# Patient Record
Sex: FEMALE | Race: WHITE | Employment: OTHER | URBAN - METROPOLITAN AREA
[De-identification: names, ages, dates, MRNs, and addresses within clinical notes are randomized per-mention and may not be internally consistent; named-entity substitution may affect disease eponyms.]

---

## 2017-01-25 RX ORDER — LORAZEPAM 0.5 MG/1
TABLET ORAL
Qty: 60 TABLET | Refills: 0 | Status: SHIPPED | OUTPATIENT
Start: 2017-01-25 | End: 2017-02-27 | Stop reason: SDUPTHER

## 2017-01-25 RX ORDER — LISINOPRIL AND HYDROCHLOROTHIAZIDE 12.5; 1 MG/1; MG/1
TABLET ORAL
Qty: 30 TABLET | Refills: 3 | Status: SHIPPED | OUTPATIENT
Start: 2017-01-25 | End: 2017-05-30 | Stop reason: SDUPTHER

## 2017-01-30 ENCOUNTER — OFFICE VISIT (OUTPATIENT)
Dept: FAMILY MEDICINE CLINIC | Facility: CLINIC | Age: 62
End: 2017-01-30

## 2017-01-30 VITALS
SYSTOLIC BLOOD PRESSURE: 110 MMHG | DIASTOLIC BLOOD PRESSURE: 82 MMHG | TEMPERATURE: 98.6 F | HEART RATE: 76 BPM | WEIGHT: 160 LBS | HEIGHT: 65 IN | BODY MASS INDEX: 26.66 KG/M2

## 2017-01-30 DIAGNOSIS — J20.8 ACUTE BRONCHITIS DUE TO OTHER SPECIFIED ORGANISMS: Primary | ICD-10-CM

## 2017-01-30 DIAGNOSIS — I10 ESSENTIAL HYPERTENSION: ICD-10-CM

## 2017-01-30 PROCEDURE — 99213 OFFICE O/P EST LOW 20 MIN: CPT | Performed by: FAMILY MEDICINE

## 2017-01-30 RX ORDER — GUAIFENESIN AND CODEINE PHOSPHATE 100; 10 MG/5ML; MG/5ML
5 SOLUTION ORAL EVERY 4 HOURS PRN
Qty: 180 ML | Refills: 0 | Status: SHIPPED | OUTPATIENT
Start: 2017-01-30 | End: 2018-02-08 | Stop reason: SDUPTHER

## 2017-01-30 RX ORDER — BENZONATATE 200 MG/1
CAPSULE ORAL
Qty: 30 CAPSULE | Refills: 1 | Status: SHIPPED | OUTPATIENT
Start: 2017-01-30 | End: 2021-03-17 | Stop reason: ALTCHOICE

## 2017-01-30 ASSESSMENT — ENCOUNTER SYMPTOMS
COUGH: 1
HEARTBURN: 0
VOMITING: 0
DOUBLE VISION: 0
BLURRED VISION: 0
EYE PAIN: 0
WHEEZING: 0
NAUSEA: 0
SHORTNESS OF BREATH: 0
ABDOMINAL PAIN: 0

## 2017-02-03 ENCOUNTER — TELEPHONE (OUTPATIENT)
Dept: FAMILY MEDICINE CLINIC | Facility: CLINIC | Age: 62
End: 2017-02-03

## 2017-02-03 RX ORDER — HYDROCODONE POLISTIREX AND CHLORPHENIRAMINE POLISTIREX 10; 8 MG/5ML; MG/5ML
5 SUSPENSION, EXTENDED RELEASE ORAL EVERY 12 HOURS PRN
Qty: 115 ML | Refills: 0 | Status: SHIPPED | OUTPATIENT
Start: 2017-02-03 | End: 2019-05-22

## 2017-02-03 RX ORDER — AZITHROMYCIN 250 MG/1
TABLET, FILM COATED ORAL
Qty: 1 PACKET | Refills: 0 | Status: SHIPPED | OUTPATIENT
Start: 2017-02-03 | End: 2017-02-13

## 2017-02-28 RX ORDER — LORAZEPAM 0.5 MG/1
TABLET ORAL
Qty: 60 TABLET | Refills: 0 | Status: SHIPPED | OUTPATIENT
Start: 2017-02-28 | End: 2017-04-03 | Stop reason: SDUPTHER

## 2017-03-07 ENCOUNTER — TELEPHONE (OUTPATIENT)
Dept: FAMILY MEDICINE CLINIC | Facility: CLINIC | Age: 62
End: 2017-03-07

## 2017-03-07 RX ORDER — VALACYCLOVIR HYDROCHLORIDE 1 G/1
2000 TABLET, FILM COATED ORAL 2 TIMES DAILY
Qty: 4 TABLET | Refills: 2 | Status: SHIPPED | OUTPATIENT
Start: 2017-03-07 | End: 2017-04-12 | Stop reason: SDUPTHER

## 2017-04-03 RX ORDER — LORAZEPAM 0.5 MG/1
TABLET ORAL
Qty: 60 TABLET | Refills: 0 | Status: SHIPPED | OUTPATIENT
Start: 2017-04-03 | End: 2017-05-01 | Stop reason: SDUPTHER

## 2017-04-12 ENCOUNTER — OFFICE VISIT (OUTPATIENT)
Dept: FAMILY MEDICINE CLINIC | Age: 62
End: 2017-04-12
Payer: MEDICARE

## 2017-04-12 VITALS
BODY MASS INDEX: 25.23 KG/M2 | SYSTOLIC BLOOD PRESSURE: 128 MMHG | TEMPERATURE: 97.1 F | DIASTOLIC BLOOD PRESSURE: 80 MMHG | WEIGHT: 157 LBS | HEART RATE: 73 BPM | HEIGHT: 66 IN

## 2017-04-12 DIAGNOSIS — J01.00 ACUTE NON-RECURRENT MAXILLARY SINUSITIS: Primary | ICD-10-CM

## 2017-04-12 DIAGNOSIS — B00.9 HERPES SIMPLEX INFECTION: ICD-10-CM

## 2017-04-12 PROCEDURE — 99213 OFFICE O/P EST LOW 20 MIN: CPT | Performed by: FAMILY MEDICINE

## 2017-04-12 RX ORDER — VALACYCLOVIR HYDROCHLORIDE 1 G/1
2000 TABLET, FILM COATED ORAL 2 TIMES DAILY
Qty: 4 TABLET | Refills: 2 | Status: SHIPPED | OUTPATIENT
Start: 2017-04-12 | End: 2019-05-22

## 2017-04-12 RX ORDER — AZITHROMYCIN 250 MG/1
TABLET, FILM COATED ORAL
Qty: 1 PACKET | Refills: 0 | Status: SHIPPED | OUTPATIENT
Start: 2017-04-12 | End: 2018-03-19 | Stop reason: ALTCHOICE

## 2017-04-12 RX ORDER — LIDOCAINE 50 MG/G
PATCH TOPICAL
COMMUNITY
Start: 2017-02-10 | End: 2019-05-22

## 2017-04-12 RX ORDER — FLUTICASONE PROPIONATE 50 MCG
1 SPRAY, SUSPENSION (ML) NASAL DAILY
Qty: 1 BOTTLE | Refills: 3 | Status: SHIPPED | OUTPATIENT
Start: 2017-04-12 | End: 2017-12-09 | Stop reason: SDUPTHER

## 2017-04-12 ASSESSMENT — PATIENT HEALTH QUESTIONNAIRE - PHQ9
2. FEELING DOWN, DEPRESSED OR HOPELESS: 1
SUM OF ALL RESPONSES TO PHQ9 QUESTIONS 1 & 2: 2
1. LITTLE INTEREST OR PLEASURE IN DOING THINGS: 1
SUM OF ALL RESPONSES TO PHQ QUESTIONS 1-9: 2

## 2017-04-12 ASSESSMENT — ENCOUNTER SYMPTOMS
WHEEZING: 0
VOMITING: 0
EYES NEGATIVE: 1
NAUSEA: 0
COUGH: 1
SPUTUM PRODUCTION: 0
SHORTNESS OF BREATH: 0
DIARRHEA: 0
SORE THROAT: 0
ABDOMINAL PAIN: 0

## 2017-05-01 RX ORDER — LORAZEPAM 0.5 MG/1
TABLET ORAL
Qty: 60 TABLET | Refills: 0 | Status: SHIPPED | OUTPATIENT
Start: 2017-05-01 | End: 2017-05-30 | Stop reason: SDUPTHER

## 2017-05-30 RX ORDER — LISINOPRIL AND HYDROCHLOROTHIAZIDE 12.5; 1 MG/1; MG/1
TABLET ORAL
Qty: 30 TABLET | Refills: 2 | Status: SHIPPED | OUTPATIENT
Start: 2017-05-30 | End: 2017-08-26 | Stop reason: SDUPTHER

## 2017-05-30 RX ORDER — LORAZEPAM 0.5 MG/1
TABLET ORAL
Qty: 60 TABLET | Refills: 0 | Status: SHIPPED | OUTPATIENT
Start: 2017-05-30 | End: 2017-06-27 | Stop reason: SDUPTHER

## 2017-06-27 RX ORDER — LORAZEPAM 0.5 MG/1
TABLET ORAL
Qty: 60 TABLET | Refills: 0 | Status: SHIPPED | OUTPATIENT
Start: 2017-06-27 | End: 2017-07-26 | Stop reason: SDUPTHER

## 2017-07-26 RX ORDER — LORAZEPAM 0.5 MG/1
TABLET ORAL
Qty: 60 TABLET | Refills: 0 | Status: SHIPPED | OUTPATIENT
Start: 2017-07-26 | End: 2017-08-24 | Stop reason: SDUPTHER

## 2017-08-24 RX ORDER — LORAZEPAM 0.5 MG/1
TABLET ORAL
Qty: 60 TABLET | Refills: 0 | Status: SHIPPED | OUTPATIENT
Start: 2017-08-24 | End: 2017-09-22 | Stop reason: SDUPTHER

## 2017-08-24 RX ORDER — LORAZEPAM 0.5 MG/1
TABLET ORAL
Qty: 60 TABLET | Refills: 0 | Status: SHIPPED | OUTPATIENT
Start: 2017-08-24 | End: 2017-08-24 | Stop reason: SDUPTHER

## 2017-09-22 RX ORDER — LORAZEPAM 0.5 MG/1
TABLET ORAL
Qty: 60 TABLET | Refills: 0 | Status: SHIPPED | OUTPATIENT
Start: 2017-09-22 | End: 2017-10-22 | Stop reason: SDUPTHER

## 2017-10-23 RX ORDER — LORAZEPAM 0.5 MG/1
TABLET ORAL
Qty: 60 TABLET | Refills: 0 | Status: SHIPPED | OUTPATIENT
Start: 2017-10-23 | End: 2017-11-19 | Stop reason: SDUPTHER

## 2017-10-30 ENCOUNTER — TELEPHONE (OUTPATIENT)
Dept: FAMILY MEDICINE CLINIC | Age: 62
End: 2017-10-30

## 2017-10-30 DIAGNOSIS — F41.9 ANXIETY: Primary | ICD-10-CM

## 2017-10-30 RX ORDER — LORAZEPAM 0.5 MG/1
0.5 TABLET ORAL EVERY 8 HOURS PRN
Qty: 30 TABLET | Refills: 0 | Status: SHIPPED | OUTPATIENT
Start: 2017-10-30 | End: 2017-11-21 | Stop reason: SDUPTHER

## 2017-10-30 NOTE — TELEPHONE ENCOUNTER
Patient called in tears asking for something go help her get through as her  is actively dying from cancer.

## 2017-11-20 RX ORDER — LORAZEPAM 0.5 MG/1
TABLET ORAL
Qty: 60 TABLET | Refills: 0 | Status: SHIPPED | OUTPATIENT
Start: 2017-11-20 | End: 2017-12-08 | Stop reason: SDUPTHER

## 2017-11-21 DIAGNOSIS — F41.9 ANXIETY: ICD-10-CM

## 2017-11-21 DIAGNOSIS — F32.9 REACTIVE DEPRESSION: Primary | ICD-10-CM

## 2017-11-21 RX ORDER — LORAZEPAM 0.5 MG/1
TABLET ORAL
Qty: 30 TABLET | Refills: 0 | Status: SHIPPED | OUTPATIENT
Start: 2017-11-21 | End: 2017-11-29 | Stop reason: SDUPTHER

## 2017-11-21 RX ORDER — CITALOPRAM 10 MG/1
10 TABLET ORAL DAILY
Qty: 30 TABLET | Refills: 3 | Status: SHIPPED | OUTPATIENT
Start: 2017-11-21 | End: 2018-03-19 | Stop reason: SDUPTHER

## 2017-11-21 NOTE — TELEPHONE ENCOUNTER
Ativan was refilled 11/20 and 11/21 so it should be at pharmacy. She can take 2 at a time if needed. Sent in rx for celexa to take daily also.

## 2017-11-21 NOTE — TELEPHONE ENCOUNTER
Patient states she spoke with you regarding the loss of her , she is asking for a refill on her ativan or higher dose, or her ativan and something to help her right now she is not as strong as she thought she was

## 2017-11-21 NOTE — TELEPHONE ENCOUNTER
Pharmacy request  Health Maintenance   Topic Date Due    Hepatitis C screen  1955    HIV screen  09/22/1968    Cervical cancer screen  09/22/1974    Lipid screen  09/22/1993    Diabetes screen  09/22/1993    Breast cancer screen  09/22/2003    Colon cancer screen colonoscopy  09/22/2003    Zostavax vaccine  09/22/2013    Flu vaccine (1) 09/01/2017    DTaP/Tdap/Td vaccine (2 - Td) 05/08/2023             (applicable per patient's age: Cancer Screenings, Depression Screening, Fall Risk Screening, Immunizations)    AST (U/L)   Date Value   06/27/2012 17     ALT (U/L)   Date Value   06/27/2012 16     BUN (mg/dL)   Date Value   06/27/2012 19      (goal A1C is < 7)   (goal LDL is <100) need 30-50% reduction from baseline     BP Readings from Last 3 Encounters:   04/12/17 128/80   01/30/17 110/82   01/08/16 (!) 174/102    (goal /80)      All Future Testing planned in [facility]:  Lab Frequency Next Occurrence       Next Visit Date:  No future appointments.          Patient Active Problem List:     Foreign body of thumb, left, infected     Lumbosacral neuritis

## 2017-11-29 DIAGNOSIS — F41.9 ANXIETY: ICD-10-CM

## 2017-11-30 RX ORDER — LORAZEPAM 0.5 MG/1
TABLET ORAL
Qty: 30 TABLET | Refills: 0 | Status: SHIPPED | OUTPATIENT
Start: 2017-11-30 | End: 2017-12-08 | Stop reason: SDUPTHER

## 2017-12-08 DIAGNOSIS — F41.9 ANXIETY: ICD-10-CM

## 2017-12-08 RX ORDER — LORAZEPAM 0.5 MG/1
TABLET ORAL
Qty: 30 TABLET | Refills: 0 | Status: SHIPPED | OUTPATIENT
Start: 2017-12-08 | End: 2017-12-19 | Stop reason: SDUPTHER

## 2017-12-10 RX ORDER — FLUTICASONE PROPIONATE 50 MCG
SPRAY, SUSPENSION (ML) NASAL
Qty: 1 BOTTLE | Refills: 2 | Status: SHIPPED | OUTPATIENT
Start: 2017-12-10 | End: 2019-05-22 | Stop reason: ALTCHOICE

## 2017-12-19 DIAGNOSIS — F41.9 ANXIETY: ICD-10-CM

## 2017-12-19 RX ORDER — LORAZEPAM 0.5 MG/1
TABLET ORAL
Qty: 30 TABLET | Refills: 0 | Status: SHIPPED | OUTPATIENT
Start: 2017-12-19 | End: 2017-12-29 | Stop reason: SDUPTHER

## 2017-12-27 RX ORDER — LISINOPRIL AND HYDROCHLOROTHIAZIDE 12.5; 1 MG/1; MG/1
TABLET ORAL
Qty: 30 TABLET | Refills: 3 | Status: SHIPPED | OUTPATIENT
Start: 2017-12-27 | End: 2018-04-26 | Stop reason: SDUPTHER

## 2017-12-29 DIAGNOSIS — F41.9 ANXIETY: ICD-10-CM

## 2017-12-29 RX ORDER — LORAZEPAM 0.5 MG/1
TABLET ORAL
Qty: 30 TABLET | Refills: 0 | Status: SHIPPED | OUTPATIENT
Start: 2017-12-29 | End: 2018-01-07 | Stop reason: SDUPTHER

## 2017-12-29 NOTE — TELEPHONE ENCOUNTER
Pharmacy request, last appt 4/12/17    Health Maintenance   Topic Date Due    Hepatitis C screen  1955    HIV screen  09/22/1968    Cervical cancer screen  09/22/1974    Lipid screen  09/22/1993    Diabetes screen  09/22/1993    Breast cancer screen  09/22/2003    Colon cancer screen colonoscopy  09/22/2003    Zostavax vaccine  09/22/2013    Flu vaccine (1) 09/01/2017    DTaP/Tdap/Td vaccine (2 - Td) 05/08/2023             (applicable per patient's age: Cancer Screenings, Depression Screening, Fall Risk Screening, Immunizations)    AST (U/L)   Date Value   06/27/2012 17     ALT (U/L)   Date Value   06/27/2012 16     BUN (mg/dL)   Date Value   06/27/2012 19      (goal A1C is < 7)   (goal LDL is <100) need 30-50% reduction from baseline     BP Readings from Last 3 Encounters:   04/12/17 128/80   01/30/17 110/82   01/08/16 (!) 174/102    (goal /80)      All Future Testing planned in [facility]:  Lab Frequency Next Occurrence       Next Visit Date:  No future appointments.          Patient Active Problem List:     Foreign body of thumb, left, infected     Lumbosacral neuritis

## 2018-01-17 DIAGNOSIS — F41.9 ANXIETY: ICD-10-CM

## 2018-01-17 RX ORDER — LORAZEPAM 0.5 MG/1
TABLET ORAL
Qty: 30 TABLET | Refills: 0 | Status: SHIPPED | OUTPATIENT
Start: 2018-01-17 | End: 2018-01-26 | Stop reason: SDUPTHER

## 2018-01-17 NOTE — TELEPHONE ENCOUNTER
Pharm requested refill last seen [phone]    Health Maintenance   Topic Date Due    Hepatitis C screen  1955    HIV screen  09/22/1968    Cervical cancer screen  09/22/1974    Lipid screen  09/22/1993    Diabetes screen  09/22/1993    Breast cancer screen  09/22/2003    Colon cancer screen colonoscopy  09/22/2003    Potassium monitoring  06/27/2013    Creatinine monitoring  06/27/2013    Zostavax vaccine  09/22/2013    Flu vaccine (1) 09/01/2017    DTaP/Tdap/Td vaccine (2 - Td) 05/08/2023             (applicable per patient's age: Cancer Screenings, Depression Screening, Fall Risk Screening, Immunizations)    AST (U/L)   Date Value   06/27/2012 17     ALT (U/L)   Date Value   06/27/2012 16     BUN (mg/dL)   Date Value   06/27/2012 19      (goal A1C is < 7)   (goal LDL is <100) need 30-50% reduction from baseline     BP Readings from Last 3 Encounters:   04/12/17 128/80   01/30/17 110/82   01/08/16 (!) 174/102    (goal /80)      All Future Testing planned in [facility]:  Lab Frequency Next Occurrence       Next Visit Date:  No future appointments.          Patient Active Problem List:     Foreign body of thumb, left, infected     Lumbosacral neuritis

## 2018-01-26 DIAGNOSIS — F41.9 ANXIETY: ICD-10-CM

## 2018-01-26 RX ORDER — LORAZEPAM 0.5 MG/1
TABLET ORAL
Qty: 30 TABLET | Refills: 0 | Status: SHIPPED | OUTPATIENT
Start: 2018-01-26 | End: 2018-02-05 | Stop reason: SDUPTHER

## 2018-02-05 DIAGNOSIS — F41.9 ANXIETY: ICD-10-CM

## 2018-02-05 RX ORDER — LORAZEPAM 0.5 MG/1
TABLET ORAL
Qty: 30 TABLET | Refills: 0 | Status: SHIPPED | OUTPATIENT
Start: 2018-02-05 | End: 2018-02-15 | Stop reason: SDUPTHER

## 2018-02-08 ENCOUNTER — TELEPHONE (OUTPATIENT)
Dept: FAMILY MEDICINE CLINIC | Age: 63
End: 2018-02-08

## 2018-02-08 DIAGNOSIS — B30.9 ACUTE VIRAL CONJUNCTIVITIS OF BOTH EYES: Primary | ICD-10-CM

## 2018-02-08 DIAGNOSIS — R05.9 COUGH: ICD-10-CM

## 2018-02-08 RX ORDER — OLOPATADINE HYDROCHLORIDE 1 MG/ML
1 SOLUTION/ DROPS OPHTHALMIC 2 TIMES DAILY
Qty: 5 ML | Refills: 1 | Status: SHIPPED | OUTPATIENT
Start: 2018-02-08 | End: 2018-03-10

## 2018-02-08 RX ORDER — GUAIFENESIN AND CODEINE PHOSPHATE 100; 10 MG/5ML; MG/5ML
5 SOLUTION ORAL EVERY 4 HOURS PRN
Qty: 180 ML | Refills: 0 | Status: SHIPPED | OUTPATIENT
Start: 2018-02-08 | End: 2018-02-13 | Stop reason: SDUPTHER

## 2018-02-08 NOTE — TELEPHONE ENCOUNTER
Pt c/o bloody nose when blowing her nose cough at night pt is getting no rest for 3 days pt has been instructed to treat the sx pt  is requesting eye drops for itching and eyes are draining pt has been using warm compress to get her eyes open in the morning [facility]

## 2018-02-13 DIAGNOSIS — R05.9 COUGH: ICD-10-CM

## 2018-02-14 RX ORDER — CODEINE PHOSPHATE AND GUAIFENESIN 10; 100 MG/5ML; MG/5ML
SOLUTION ORAL
Qty: 180 ML | Refills: 0 | Status: SHIPPED | OUTPATIENT
Start: 2018-02-14 | End: 2018-02-22 | Stop reason: SDUPTHER

## 2018-02-15 DIAGNOSIS — F41.9 ANXIETY: ICD-10-CM

## 2018-02-15 RX ORDER — LORAZEPAM 0.5 MG/1
TABLET ORAL
Qty: 30 TABLET | Refills: 0 | Status: SHIPPED | OUTPATIENT
Start: 2018-02-15 | End: 2018-02-26 | Stop reason: SDUPTHER

## 2018-02-22 DIAGNOSIS — R05.9 COUGH: ICD-10-CM

## 2018-02-22 RX ORDER — CODEINE PHOSPHATE AND GUAIFENESIN 10; 100 MG/5ML; MG/5ML
SOLUTION ORAL
Qty: 1 BOTTLE | Refills: 0 | Status: SHIPPED | OUTPATIENT
Start: 2018-02-22 | End: 2018-03-01

## 2018-02-26 DIAGNOSIS — F41.9 ANXIETY: ICD-10-CM

## 2018-02-26 RX ORDER — LORAZEPAM 0.5 MG/1
TABLET ORAL
Qty: 30 TABLET | Refills: 0 | Status: SHIPPED | OUTPATIENT
Start: 2018-02-26 | End: 2018-03-07 | Stop reason: SDUPTHER

## 2018-03-07 DIAGNOSIS — F41.9 ANXIETY: ICD-10-CM

## 2018-03-07 RX ORDER — LORAZEPAM 0.5 MG/1
TABLET ORAL
Qty: 30 TABLET | Refills: 0 | Status: SHIPPED | OUTPATIENT
Start: 2018-03-07 | End: 2018-03-18 | Stop reason: SDUPTHER

## 2018-03-07 NOTE — TELEPHONE ENCOUNTER
04/12/2017 last seen pharmacy request refill. Health Maintenance   Topic Date Due    Hepatitis C screen  1955    HIV screen  09/22/1968    Cervical cancer screen  09/22/1974    Lipid screen  09/22/1993    Diabetes screen  09/22/1993    Breast cancer screen  09/22/2003    Shingles Vaccine (1 of 2 - 2 Dose Series) 09/22/2003    Colon cancer screen colonoscopy  09/22/2003    Potassium monitoring  06/27/2013    Creatinine monitoring  06/27/2013    Flu vaccine (1) 09/01/2017    DTaP/Tdap/Td vaccine (2 - Td) 05/08/2023             (applicable per patient's age: Cancer Screenings, Depression Screening, Fall Risk Screening, Immunizations)    AST (U/L)   Date Value   06/27/2012 17     ALT (U/L)   Date Value   06/27/2012 16     BUN (mg/dL)   Date Value   06/27/2012 19      (goal A1C is < 7)   (goal LDL is <100) need 30-50% reduction from baseline     BP Readings from Last 3 Encounters:   04/12/17 128/80   01/30/17 110/82   01/08/16 (!) 174/102    (goal /80)      All Future Testing planned in [facility]:  Lab Frequency Next Occurrence       Next Visit Date:  No future appointments.          Patient Active Problem List:     Foreign body of thumb, left, infected     Lumbosacral neuritis

## 2018-03-19 DIAGNOSIS — F32.9 REACTIVE DEPRESSION: ICD-10-CM

## 2018-03-19 RX ORDER — CITALOPRAM 10 MG/1
TABLET ORAL
Qty: 30 TABLET | Refills: 2 | Status: SHIPPED | OUTPATIENT
Start: 2018-03-19 | End: 2018-06-18 | Stop reason: ALTCHOICE

## 2018-04-06 ENCOUNTER — HOSPITAL ENCOUNTER (EMERGENCY)
Age: 63
Discharge: HOME | End: 2018-04-06
Attending: FAMILY MEDICINE
Payer: COMMERCIAL

## 2018-04-06 VITALS
TEMPERATURE: 98.4 F | BODY MASS INDEX: 25.39 KG/M2 | WEIGHT: 158 LBS | HEART RATE: 80 BPM | HEIGHT: 66 IN | RESPIRATION RATE: 16 BRPM | SYSTOLIC BLOOD PRESSURE: 123 MMHG | OXYGEN SATURATION: 95 % | DIASTOLIC BLOOD PRESSURE: 73 MMHG

## 2018-04-06 DIAGNOSIS — T23.222A PARTIAL THICKNESS BURN OF FINGER OF LEFT HAND, INITIAL ENCOUNTER: Primary | ICD-10-CM

## 2018-04-06 PROCEDURE — 6370000000 HC RX 637 (ALT 250 FOR IP): Performed by: NURSE PRACTITIONER

## 2018-04-06 PROCEDURE — 6370000000 HC RX 637 (ALT 250 FOR IP): Performed by: FAMILY MEDICINE

## 2018-04-06 PROCEDURE — 99283 EMERGENCY DEPT VISIT LOW MDM: CPT

## 2018-04-06 PROCEDURE — 16000 INITIAL TREATMENT OF BURN(S): CPT

## 2018-04-06 RX ORDER — HYDROCODONE BITARTRATE AND ACETAMINOPHEN 5; 325 MG/1; MG/1
2 TABLET ORAL ONCE
Status: COMPLETED | OUTPATIENT
Start: 2018-04-06 | End: 2018-04-06

## 2018-04-06 RX ORDER — IBUPROFEN 600 MG/1
600 TABLET ORAL EVERY 8 HOURS PRN
Qty: 20 TABLET | Refills: 0 | Status: SHIPPED | OUTPATIENT
Start: 2018-04-06 | End: 2019-05-22

## 2018-04-06 RX ORDER — CEPHALEXIN 500 MG/1
500 CAPSULE ORAL 4 TIMES DAILY
Qty: 40 CAPSULE | Refills: 0 | Status: SHIPPED | OUTPATIENT
Start: 2018-04-06 | End: 2018-04-16

## 2018-04-06 RX ADMIN — LIDOCAINE HYDROCHLORIDE: 20 JELLY TOPICAL at 16:36

## 2018-04-06 RX ADMIN — HYDROCODONE BITARTRATE AND ACETAMINOPHEN 2 TABLET: 5; 325 TABLET ORAL at 16:08

## 2018-04-06 ASSESSMENT — PAIN DESCRIPTION - PAIN TYPE: TYPE: ACUTE PAIN

## 2018-04-06 ASSESSMENT — PAIN SCALES - GENERAL
PAINLEVEL_OUTOF10: 2
PAINLEVEL_OUTOF10: 9

## 2018-04-06 ASSESSMENT — ENCOUNTER SYMPTOMS: COLOR CHANGE: 1

## 2018-04-06 ASSESSMENT — PAIN DESCRIPTION - DESCRIPTORS: DESCRIPTORS: ACHING

## 2018-04-26 RX ORDER — LISINOPRIL AND HYDROCHLOROTHIAZIDE 12.5; 1 MG/1; MG/1
TABLET ORAL
Qty: 30 TABLET | Refills: 2 | Status: SHIPPED | OUTPATIENT
Start: 2018-04-26 | End: 2018-07-26 | Stop reason: SDUPTHER

## 2018-05-12 DIAGNOSIS — F41.9 ANXIETY: ICD-10-CM

## 2018-05-14 RX ORDER — LORAZEPAM 0.5 MG/1
TABLET ORAL
Qty: 60 TABLET | Refills: 0 | Status: SHIPPED | OUTPATIENT
Start: 2018-05-14 | End: 2018-05-31 | Stop reason: SDUPTHER

## 2018-05-31 DIAGNOSIS — F41.9 ANXIETY: ICD-10-CM

## 2018-06-01 RX ORDER — LORAZEPAM 0.5 MG/1
TABLET ORAL
Qty: 60 TABLET | Refills: 0 | Status: SHIPPED | OUTPATIENT
Start: 2018-06-01 | End: 2018-06-19 | Stop reason: SDUPTHER

## 2018-06-18 ENCOUNTER — OFFICE VISIT (OUTPATIENT)
Dept: FAMILY MEDICINE CLINIC | Age: 63
End: 2018-06-18
Payer: COMMERCIAL

## 2018-06-18 VITALS
SYSTOLIC BLOOD PRESSURE: 120 MMHG | DIASTOLIC BLOOD PRESSURE: 80 MMHG | HEART RATE: 80 BPM | BODY MASS INDEX: 25.82 KG/M2 | WEIGHT: 160 LBS

## 2018-06-18 DIAGNOSIS — Z90.710 H/O TOTAL HYSTERECTOMY: ICD-10-CM

## 2018-06-18 DIAGNOSIS — Z13.31 POSITIVE DEPRESSION SCREENING: ICD-10-CM

## 2018-06-18 DIAGNOSIS — J30.2 CHRONIC SEASONAL ALLERGIC RHINITIS DUE TO OTHER ALLERGEN: ICD-10-CM

## 2018-06-18 DIAGNOSIS — F41.9 ANXIETY: ICD-10-CM

## 2018-06-18 DIAGNOSIS — F32.A DEPRESSIVE DISORDER: ICD-10-CM

## 2018-06-18 DIAGNOSIS — Z11.9 SCREENING EXAMINATION FOR INFECTIOUS DISEASE: ICD-10-CM

## 2018-06-18 DIAGNOSIS — Z13.220 SCREENING CHOLESTEROL LEVEL: ICD-10-CM

## 2018-06-18 DIAGNOSIS — F32.9 REACTIVE DEPRESSION: Primary | ICD-10-CM

## 2018-06-18 DIAGNOSIS — I10 ESSENTIAL HYPERTENSION: ICD-10-CM

## 2018-06-18 DIAGNOSIS — F43.21 GRIEF REACTION: ICD-10-CM

## 2018-06-18 PROCEDURE — 3017F COLORECTAL CA SCREEN DOC REV: CPT | Performed by: FAMILY MEDICINE

## 2018-06-18 PROCEDURE — G8431 POS CLIN DEPRES SCRN F/U DOC: HCPCS | Performed by: FAMILY MEDICINE

## 2018-06-18 PROCEDURE — G8427 DOCREV CUR MEDS BY ELIG CLIN: HCPCS | Performed by: FAMILY MEDICINE

## 2018-06-18 PROCEDURE — 1036F TOBACCO NON-USER: CPT | Performed by: FAMILY MEDICINE

## 2018-06-18 PROCEDURE — 99214 OFFICE O/P EST MOD 30 MIN: CPT | Performed by: FAMILY MEDICINE

## 2018-06-18 PROCEDURE — G0444 DEPRESSION SCREEN ANNUAL: HCPCS | Performed by: FAMILY MEDICINE

## 2018-06-18 PROCEDURE — G8419 CALC BMI OUT NRM PARAM NOF/U: HCPCS | Performed by: FAMILY MEDICINE

## 2018-06-18 RX ORDER — CITALOPRAM 20 MG/1
TABLET ORAL
Qty: 30 TABLET | Refills: 0 | Status: SHIPPED | OUTPATIENT
Start: 2018-06-18 | End: 2018-10-15 | Stop reason: SDUPTHER

## 2018-06-18 RX ORDER — CITALOPRAM 20 MG/1
20 TABLET ORAL DAILY
Qty: 30 TABLET | Refills: 3 | Status: SHIPPED | OUTPATIENT
Start: 2018-06-18 | End: 2018-06-18 | Stop reason: SDUPTHER

## 2018-06-18 RX ORDER — PREGABALIN 50 MG/1
50 CAPSULE ORAL DAILY
COMMUNITY
End: 2021-03-17 | Stop reason: ALTCHOICE

## 2018-06-18 ASSESSMENT — ENCOUNTER SYMPTOMS
SHORTNESS OF BREATH: 0
BACK PAIN: 1
EYES NEGATIVE: 1
CONSTIPATION: 0
ABDOMINAL PAIN: 0
COUGH: 0
BLOOD IN STOOL: 0

## 2018-06-18 ASSESSMENT — PATIENT HEALTH QUESTIONNAIRE - PHQ9
4. FEELING TIRED OR HAVING LITTLE ENERGY: 3
10. IF YOU CHECKED OFF ANY PROBLEMS, HOW DIFFICULT HAVE THESE PROBLEMS MADE IT FOR YOU TO DO YOUR WORK, TAKE CARE OF THINGS AT HOME, OR GET ALONG WITH OTHER PEOPLE: 0
9. THOUGHTS THAT YOU WOULD BE BETTER OFF DEAD, OR OF HURTING YOURSELF: 1
6. FEELING BAD ABOUT YOURSELF - OR THAT YOU ARE A FAILURE OR HAVE LET YOURSELF OR YOUR FAMILY DOWN: 3
SUM OF ALL RESPONSES TO PHQ9 QUESTIONS 1 & 2: 6
SUM OF ALL RESPONSES TO PHQ QUESTIONS 1-9: 18
2. FEELING DOWN, DEPRESSED OR HOPELESS: 3
5. POOR APPETITE OR OVEREATING: 3
1. LITTLE INTEREST OR PLEASURE IN DOING THINGS: 3
3. TROUBLE FALLING OR STAYING ASLEEP: 2
8. MOVING OR SPEAKING SO SLOWLY THAT OTHER PEOPLE COULD HAVE NOTICED. OR THE OPPOSITE, BEING SO FIGETY OR RESTLESS THAT YOU HAVE BEEN MOVING AROUND A LOT MORE THAN USUAL: 0
7. TROUBLE CONCENTRATING ON THINGS, SUCH AS READING THE NEWSPAPER OR WATCHING TELEVISION: 0

## 2018-06-19 DIAGNOSIS — F41.9 ANXIETY: ICD-10-CM

## 2018-06-19 RX ORDER — LORAZEPAM 0.5 MG/1
TABLET ORAL
Qty: 60 TABLET | Refills: 0 | Status: SHIPPED | OUTPATIENT
Start: 2018-06-19 | End: 2018-07-07 | Stop reason: SDUPTHER

## 2018-07-26 RX ORDER — LISINOPRIL AND HYDROCHLOROTHIAZIDE 12.5; 1 MG/1; MG/1
TABLET ORAL
Qty: 30 TABLET | Refills: 1 | Status: SHIPPED | OUTPATIENT
Start: 2018-07-26 | End: 2018-09-23 | Stop reason: SDUPTHER

## 2018-08-14 DIAGNOSIS — F41.9 ANXIETY: ICD-10-CM

## 2018-08-14 RX ORDER — LORAZEPAM 0.5 MG/1
TABLET ORAL
Qty: 60 TABLET | Refills: 0 | Status: SHIPPED | OUTPATIENT
Start: 2018-08-14 | End: 2018-09-06 | Stop reason: SDUPTHER

## 2018-08-14 NOTE — TELEPHONE ENCOUNTER
06/18/2018 last in office appt,  Health Maintenance   Topic Date Due    Hepatitis C screen  1955    HIV screen  09/22/1968    Lipid screen  09/22/1993    Diabetes screen  09/22/1993    Breast cancer screen  09/22/2003    Shingles Vaccine (1 of 2 - 2 Dose Series) 09/22/2003    Colon cancer screen colonoscopy  09/22/2003    Potassium monitoring  06/27/2013    Creatinine monitoring  06/27/2013    Flu vaccine (1) 09/01/2018    DTaP/Tdap/Td vaccine (2 - Td) 05/08/2023             (applicable per patient's age: Cancer Screenings, Depression Screening, Fall Risk Screening, Immunizations)    AST (U/L)   Date Value   06/27/2012 17     ALT (U/L)   Date Value   06/27/2012 16     BUN (mg/dL)   Date Value   06/27/2012 19      (goal A1C is < 7)   (goal LDL is <100) need 30-50% reduction from baseline     BP Readings from Last 3 Encounters:   06/18/18 120/80   04/06/18 123/73   04/12/17 128/80    (goal /80)      All Future Testing planned in [facility]:  Lab Frequency Next Occurrence   Lipid Panel Once 09/13/2018   Comprehensive Metabolic Panel Once 14/40/9763   Hepatitis C Antibody Once 12/26/2018   HIV Screen Once 09/13/2018       Next Visit Date:  No future appointments.          Patient Active Problem List:     Foreign body of thumb, left, infected     Lumbosacral neuritis     Allergic rhinitis     H/O total hysterectomy

## 2018-11-02 ENCOUNTER — TELEPHONE (OUTPATIENT)
Dept: FAMILY MEDICINE CLINIC | Age: 63
End: 2018-11-02

## 2018-12-03 DIAGNOSIS — F41.9 ANXIETY: ICD-10-CM

## 2018-12-03 RX ORDER — LORAZEPAM 0.5 MG/1
TABLET ORAL
Qty: 60 TABLET | Refills: 0 | Status: SHIPPED | OUTPATIENT
Start: 2018-12-03 | End: 2019-01-07 | Stop reason: SDUPTHER

## 2019-01-07 DIAGNOSIS — F41.9 ANXIETY: ICD-10-CM

## 2019-01-07 RX ORDER — LORAZEPAM 0.5 MG/1
TABLET ORAL
Qty: 60 TABLET | Refills: 0 | Status: SHIPPED | OUTPATIENT
Start: 2019-01-07 | End: 2019-02-15 | Stop reason: SDUPTHER

## 2019-02-15 DIAGNOSIS — F41.9 ANXIETY: ICD-10-CM

## 2019-02-15 RX ORDER — LORAZEPAM 0.5 MG/1
TABLET ORAL
Qty: 60 TABLET | Refills: 0 | Status: SHIPPED | OUTPATIENT
Start: 2019-02-15 | End: 2019-03-18 | Stop reason: SDUPTHER

## 2019-03-18 DIAGNOSIS — F41.9 ANXIETY: ICD-10-CM

## 2019-03-18 RX ORDER — LORAZEPAM 0.5 MG/1
TABLET ORAL
Qty: 60 TABLET | Refills: 0 | Status: SHIPPED | OUTPATIENT
Start: 2019-03-18 | End: 2019-05-02 | Stop reason: SDUPTHER

## 2019-05-02 DIAGNOSIS — F41.9 ANXIETY: ICD-10-CM

## 2019-05-02 RX ORDER — LORAZEPAM 0.5 MG/1
TABLET ORAL
Qty: 60 TABLET | Refills: 0 | Status: SHIPPED | OUTPATIENT
Start: 2019-05-02 | End: 2019-06-03 | Stop reason: SDUPTHER

## 2019-05-02 NOTE — TELEPHONE ENCOUNTER
Pharm requested refill     Health Maintenance   Topic Date Due    Hepatitis C screen  1955    HIV screen  09/22/1968    Lipid screen  09/22/1993    Breast cancer screen  09/22/2003    Shingles Vaccine (1 of 2) 09/22/2003    Colon cancer screen colonoscopy  09/22/2003    Potassium monitoring  06/27/2013    Creatinine monitoring  06/27/2013    DEXA (modify frequency per FRAX score)  09/22/2018    Pneumococcal 65+ years Vaccine (1 of 2 - PCV13) 09/22/2018    Flu vaccine (Season Ended) 09/01/2019    DTaP/Tdap/Td vaccine (2 - Td) 05/08/2023             (applicable per patient's age: Cancer Screenings, Depression Screening, Fall Risk Screening, Immunizations)    AST (U/L)   Date Value   06/27/2012 17     ALT (U/L)   Date Value   06/27/2012 16     BUN (mg/dL)   Date Value   06/27/2012 19      (goal A1C is < 7)   (goal LDL is <100) need 30-50% reduction from baseline     BP Readings from Last 3 Encounters:   06/18/18 120/80   04/06/18 123/73   04/12/17 128/80    (goal /80)      All Future Testing planned in [facility]:  Lab Frequency Next Occurrence   Lipid Panel Once 09/13/2018   Comprehensive Metabolic Panel Once 90/67/3041   Hepatitis C Antibody Once 12/26/2018   HIV Screen Once 09/13/2018       Next Visit Date:  Future Appointments   Date Time Provider Yonatan Moreno   5/22/2019  9:15 AM MD ina Reinoso [facility]            Patient Active Problem List:     Foreign body of thumb, left, infected     Lumbosacral neuritis     Allergic rhinitis     H/O total hysterectomy

## 2019-05-22 ENCOUNTER — OFFICE VISIT (OUTPATIENT)
Dept: FAMILY MEDICINE CLINIC | Age: 64
End: 2019-05-22
Payer: COMMERCIAL

## 2019-05-22 VITALS
SYSTOLIC BLOOD PRESSURE: 100 MMHG | BODY MASS INDEX: 28.28 KG/M2 | WEIGHT: 176 LBS | HEIGHT: 66 IN | DIASTOLIC BLOOD PRESSURE: 60 MMHG | HEART RATE: 84 BPM

## 2019-05-22 DIAGNOSIS — G89.29 CHRONIC RIGHT SHOULDER PAIN: ICD-10-CM

## 2019-05-22 DIAGNOSIS — G62.9 NEUROPATHY: ICD-10-CM

## 2019-05-22 DIAGNOSIS — I10 ESSENTIAL HYPERTENSION: Primary | ICD-10-CM

## 2019-05-22 DIAGNOSIS — M25.511 CHRONIC RIGHT SHOULDER PAIN: ICD-10-CM

## 2019-05-22 DIAGNOSIS — F41.9 ANXIETY: ICD-10-CM

## 2019-05-22 DIAGNOSIS — Z13.220 SCREENING CHOLESTEROL LEVEL: ICD-10-CM

## 2019-05-22 DIAGNOSIS — Z11.9 SCREENING EXAMINATION FOR INFECTIOUS DISEASE: ICD-10-CM

## 2019-05-22 PROCEDURE — 99214 OFFICE O/P EST MOD 30 MIN: CPT | Performed by: FAMILY MEDICINE

## 2019-05-22 ASSESSMENT — PATIENT HEALTH QUESTIONNAIRE - PHQ9
SUM OF ALL RESPONSES TO PHQ QUESTIONS 1-9: 1
1. LITTLE INTEREST OR PLEASURE IN DOING THINGS: 0
2. FEELING DOWN, DEPRESSED OR HOPELESS: 1
SUM OF ALL RESPONSES TO PHQ9 QUESTIONS 1 & 2: 1
SUM OF ALL RESPONSES TO PHQ QUESTIONS 1-9: 1

## 2019-05-22 ASSESSMENT — ENCOUNTER SYMPTOMS
COUGH: 1
BACK PAIN: 1
CONSTIPATION: 0
BLOOD IN STOOL: 0
EYES NEGATIVE: 1
SHORTNESS OF BREATH: 0
ABDOMINAL PAIN: 0
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
## 2019-05-22 NOTE — PROGRESS NOTES
Visit Information    Have you changed or started any medications since your last visit including any over-the-counter medicines, vitamins, or herbal medicines? no   Are you having any side effects from any of your medications? -  no  Have you stopped taking any of your medications? Is so, why? -  no    Have you seen any other physician or provider since your last visit? Neuro  Have you had any other diagnostic tests since your last visit? No  Have you been seen in the emergency room and/or had an admission to a hospital since we last saw you? No  Have you had your routine dental cleaning in the past 6 months? no    Have you activated your [facility] account? If not, what are your barriers?  Yes     Patient Care Team:  Carlos Kumar MD as PCP - General    Medical History Review  Past Medical, Family, and Social History reviewed and does contribute to the patient presenting condition    Health Maintenance   Topic Date Due    Hepatitis C screen  1955    HIV screen  09/22/1968    Lipid screen  09/22/1993    Breast cancer screen  09/22/2003    Shingles Vaccine (1 of 2) 09/22/2003    Colon cancer screen colonoscopy  09/22/2003    Potassium monitoring  06/27/2013    Creatinine monitoring  06/27/2013    DEXA (modify frequency per FRAX score)  09/22/2018    Pneumococcal 65+ years Vaccine (1 of 2 - PCV13) 09/22/2018    Flu vaccine (Season Ended) 09/01/2019    DTaP/Tdap/Td vaccine (2 - Td) 05/08/2023

## 2019-06-03 DIAGNOSIS — F41.9 ANXIETY: ICD-10-CM

## 2019-06-04 RX ORDER — LORAZEPAM 0.5 MG/1
TABLET ORAL
Qty: 60 TABLET | Refills: 0 | Status: SHIPPED | OUTPATIENT
Start: 2019-06-04 | End: 2019-07-04 | Stop reason: SDUPTHER

## 2019-07-04 DIAGNOSIS — F41.9 ANXIETY: ICD-10-CM

## 2019-07-05 RX ORDER — LORAZEPAM 0.5 MG/1
TABLET ORAL
Qty: 60 TABLET | Refills: 0 | Status: SHIPPED | OUTPATIENT
Start: 2019-07-05 | End: 2019-07-24 | Stop reason: SDUPTHER

## 2019-07-24 DIAGNOSIS — F41.9 ANXIETY: ICD-10-CM

## 2019-07-24 RX ORDER — LORAZEPAM 0.5 MG/1
TABLET ORAL
Qty: 60 TABLET | Refills: 0 | Status: SHIPPED | OUTPATIENT
Start: 2019-07-24 | End: 2019-09-02 | Stop reason: SDUPTHER

## 2019-09-02 DIAGNOSIS — F41.9 ANXIETY: ICD-10-CM

## 2019-09-02 RX ORDER — LORAZEPAM 0.5 MG/1
TABLET ORAL
Qty: 60 TABLET | Refills: 0 | Status: SHIPPED | OUTPATIENT
Start: 2019-09-02 | End: 2019-10-03 | Stop reason: SDUPTHER

## 2019-10-03 DIAGNOSIS — F41.9 ANXIETY: ICD-10-CM

## 2019-10-04 RX ORDER — LORAZEPAM 0.5 MG/1
TABLET ORAL
Qty: 60 TABLET | Refills: 0 | Status: SHIPPED | OUTPATIENT
Start: 2019-10-04 | End: 2019-11-09 | Stop reason: SDUPTHER

## 2019-11-09 DIAGNOSIS — F41.9 ANXIETY: ICD-10-CM

## 2019-11-11 RX ORDER — LORAZEPAM 0.5 MG/1
TABLET ORAL
Qty: 60 TABLET | Refills: 0 | Status: SHIPPED | OUTPATIENT
Start: 2019-11-11 | End: 2019-12-17 | Stop reason: SDUPTHER

## 2019-12-04 ENCOUNTER — OFFICE VISIT (OUTPATIENT)
Dept: FAMILY MEDICINE CLINIC | Age: 64
End: 2019-12-04
Payer: COMMERCIAL

## 2019-12-04 VITALS
SYSTOLIC BLOOD PRESSURE: 138 MMHG | HEART RATE: 69 BPM | WEIGHT: 182 LBS | BODY MASS INDEX: 29.38 KG/M2 | OXYGEN SATURATION: 98 % | DIASTOLIC BLOOD PRESSURE: 80 MMHG

## 2019-12-04 DIAGNOSIS — I10 ESSENTIAL HYPERTENSION: Primary | ICD-10-CM

## 2019-12-04 DIAGNOSIS — F32.9 REACTIVE DEPRESSION: ICD-10-CM

## 2019-12-04 DIAGNOSIS — Z11.9 SCREENING EXAMINATION FOR INFECTIOUS DISEASE: ICD-10-CM

## 2019-12-04 DIAGNOSIS — Z13.220 SCREENING CHOLESTEROL LEVEL: ICD-10-CM

## 2019-12-04 DIAGNOSIS — F41.9 ANXIETY: ICD-10-CM

## 2019-12-04 PROCEDURE — 99214 OFFICE O/P EST MOD 30 MIN: CPT | Performed by: FAMILY MEDICINE

## 2019-12-04 RX ORDER — MELOXICAM 15 MG/1
TABLET ORAL
COMMUNITY
Start: 2019-11-10 | End: 2021-03-17 | Stop reason: ALTCHOICE

## 2019-12-04 ASSESSMENT — ENCOUNTER SYMPTOMS
COUGH: 0
SHORTNESS OF BREATH: 0
DIARRHEA: 0
ALLERGIC/IMMUNOLOGIC NEGATIVE: 1
ABDOMINAL PAIN: 0
CONSTIPATION: 0
BLOOD IN STOOL: 0
EYES NEGATIVE: 1

## 2019-12-10 DIAGNOSIS — F32.9 REACTIVE DEPRESSION: ICD-10-CM

## 2019-12-10 RX ORDER — CITALOPRAM 20 MG/1
TABLET ORAL
Qty: 30 TABLET | Refills: 4 | Status: SHIPPED | OUTPATIENT
Start: 2019-12-10 | End: 2020-05-18

## 2019-12-17 DIAGNOSIS — F41.9 ANXIETY: ICD-10-CM

## 2019-12-17 RX ORDER — LORAZEPAM 0.5 MG/1
TABLET ORAL
Qty: 60 TABLET | Refills: 0 | Status: SHIPPED | OUTPATIENT
Start: 2019-12-17 | End: 2020-01-27

## 2020-01-27 RX ORDER — LORAZEPAM 0.5 MG/1
TABLET ORAL
Qty: 60 TABLET | Refills: 0 | Status: SHIPPED | OUTPATIENT
Start: 2020-01-27 | End: 2020-02-26

## 2020-02-26 RX ORDER — LORAZEPAM 0.5 MG/1
TABLET ORAL
Qty: 60 TABLET | Refills: 0 | Status: SHIPPED | OUTPATIENT
Start: 2020-02-26 | End: 2020-03-23

## 2020-02-26 NOTE — TELEPHONE ENCOUNTER
Pharmacy request      .  Health Maintenance   Topic Date Due    Hepatitis C screen  1955    Lipid screen  09/22/1993    Diabetes screen  09/22/1993    Breast cancer screen  09/22/2003    Shingles Vaccine (1 of 2) 09/22/2003    Colon cancer screen colonoscopy  09/22/2003    Potassium monitoring  06/27/2013    Creatinine monitoring  06/27/2013    DEXA (modify frequency per FRAX score)  09/22/2018    Pneumococcal 65+ years Vaccine (1 of 1 - PPSV23) 09/22/2018    Flu vaccine (1) 09/01/2019    DTaP/Tdap/Td vaccine (2 - Td) 05/08/2023    Hepatitis A vaccine  Aged Out    Hepatitis B vaccine  Aged Out    Hib vaccine  Aged Out    Meningococcal (ACWY) vaccine  Aged Out             (applicable per patient's age: Cancer Screenings, Depression Screening, Fall Risk Screening, Immunizations)    AST (U/L)   Date Value   06/27/2012 17     ALT (U/L)   Date Value   06/27/2012 16     BUN (mg/dL)   Date Value   06/27/2012 19      (goal A1C is < 7)   (goal LDL is <100) need 30-50% reduction from baseline     BP Readings from Last 3 Encounters:   12/04/19 138/80   05/22/19 100/60   06/18/18 120/80    (goal /80)      All Future Testing planned in [facility]:  Lab Frequency Next Occurrence   Comprehensive Metabolic Panel Once 07/78/5619   Lipid Panel Once 01/19/2020   Hepatitis C Antibody Once 02/19/2020   HIV Screen Once 02/19/2020       Next Visit Date:  Future Appointments   Date Time Provider Yonatan Moreno   6/5/2020 11:15 AM MD ina [place] [id number]            Patient Active Problem List:     Foreign body of thumb, left, infected     Lumbosacral neuritis     Allergic rhinitis     H/O total hysterectomy     Hypertension     Anxiety     Neuropathy

## 2020-03-23 RX ORDER — LORAZEPAM 0.5 MG/1
TABLET ORAL
Qty: 60 TABLET | Refills: 0 | Status: SHIPPED | OUTPATIENT
Start: 2020-03-23 | End: 2020-04-28

## 2020-04-28 RX ORDER — LORAZEPAM 0.5 MG/1
TABLET ORAL
Qty: 60 TABLET | Refills: 0 | Status: SHIPPED | OUTPATIENT
Start: 2020-04-28 | End: 2020-06-02

## 2020-06-02 RX ORDER — LORAZEPAM 0.5 MG/1
TABLET ORAL
Qty: 60 TABLET | Refills: 0 | Status: SHIPPED | OUTPATIENT
Start: 2020-06-02 | End: 2020-07-17

## 2020-06-02 NOTE — TELEPHONE ENCOUNTER
Pharm requested refill    Health Maintenance   Topic Date Due    Hepatitis C screen  1955    Lipid screen  09/22/1993    Breast cancer screen  09/22/2003    Shingles Vaccine (1 of 2) 09/22/2003    Colon cancer screen colonoscopy  09/22/2003    DEXA (modify frequency per FRAX score)  09/22/2008    Potassium monitoring  06/27/2013    Creatinine monitoring  06/27/2013    Pneumococcal 65+ years Vaccine (1 of 1 - PPSV23) 09/22/2018    Flu vaccine (Season Ended) 09/01/2020    DTaP/Tdap/Td vaccine (2 - Td) 05/08/2023    Hepatitis A vaccine  Aged Out    Hepatitis B vaccine  Aged Out    Hib vaccine  Aged Out    Meningococcal (ACWY) vaccine  Aged Out             (applicable per patient's age: Cancer Screenings, Depression Screening, Fall Risk Screening, Immunizations)    AST (U/L)   Date Value   06/27/2012 17     ALT (U/L)   Date Value   06/27/2012 16     BUN (mg/dL)   Date Value   06/27/2012 19      (goal A1C is < 7)   (goal LDL is <100) need 30-50% reduction from baseline     BP Readings from Last 3 Encounters:   12/04/19 138/80   05/22/19 100/60   06/18/18 120/80    (goal /80)      All Future Testing planned in [facility]:  Lab Frequency Next Occurrence   Comprehensive Metabolic Panel Once 52/26/7778   Lipid Panel Once 01/19/2020   Hepatitis C Antibody Once 03/27/2020   HIV Screen Once 03/27/2020       Next Visit Date:  Future Appointments   Date Time Provider Yonatan Moreno   6/5/2020 11:15 AM MD ina [place] [facility]            Patient Active Problem List:     Foreign body of thumb, left, infected     Lumbosacral neuritis     Allergic rhinitis     H/O total hysterectomy     Hypertension     Anxiety     Neuropathy

## 2020-06-21 RX ORDER — LISINOPRIL AND HYDROCHLOROTHIAZIDE 12.5; 1 MG/1; MG/1
TABLET ORAL
Qty: 60 TABLET | Refills: 4 | Status: SHIPPED | OUTPATIENT
Start: 2020-06-21 | End: 2021-03-17 | Stop reason: ALTCHOICE

## 2020-07-17 RX ORDER — LORAZEPAM 0.5 MG/1
TABLET ORAL
Qty: 60 TABLET | Refills: 0 | Status: SHIPPED | OUTPATIENT
Start: 2020-07-17 | End: 2020-08-17

## 2020-07-17 NOTE — TELEPHONE ENCOUNTER
Last visit: 12/4/19  Last Med refill: 6/2/2020  Does patient have enough medication for 72 hours: No:     Next Visit Date:  No future appointments.     Health Maintenance   Topic Date Due    Hepatitis C screen  1955    Lipid screen  09/22/1993    Breast cancer screen  09/22/2003    Shingles Vaccine (1 of 2) 09/22/2003    Colon cancer screen colonoscopy  09/22/2003    DEXA (modify frequency per FRAX score)  09/22/2008    Potassium monitoring  06/27/2013    Creatinine monitoring  06/27/2013    Pneumococcal 65+ years Vaccine (1 of 1 - PPSV23) 09/22/2018    Flu vaccine (1) 09/01/2020    DTaP/Tdap/Td vaccine (2 - Td) 05/08/2023    Hepatitis A vaccine  Aged Out    Hepatitis B vaccine  Aged Out    Hib vaccine  Aged Out    Meningococcal (ACWY) vaccine  Aged Out       No results found for: LABA1C          ( goal A1C is < 7)   No results found for: LABMICR  No results found for: LDLCHOLESTEROL, LDLCALC    (goal LDL is <100)   AST (U/L)   Date Value   06/27/2012 17     ALT (U/L)   Date Value   06/27/2012 16     BUN (mg/dL)   Date Value   06/27/2012 19     BP Readings from Last 3 Encounters:   12/04/19 138/80   05/22/19 100/60   06/18/18 120/80          (goal 120/80)    All Future Testing planned in [facility]  Lab Frequency Next Occurrence   Comprehensive Metabolic Panel Once 25/70/7275   Lipid Panel Once 01/19/2020   Hepatitis C Antibody Once 03/27/2020   HIV Screen Once 03/27/2020               Patient Active Problem List:     Foreign body of thumb, left, infected     Lumbosacral neuritis     Allergic rhinitis     H/O total hysterectomy     Hypertension     Anxiety     Neuropathy

## 2020-08-13 ENCOUNTER — TELEPHONE (OUTPATIENT)
Dept: FAMILY MEDICINE CLINIC | Age: 65
End: 2020-08-13

## 2020-08-13 NOTE — TELEPHONE ENCOUNTER
She can try stopping the med, check bp and let me know if cough resolves and if she needs a different bp med

## 2020-08-13 NOTE — TELEPHONE ENCOUNTER
Pt take lisinopril 10-12.5 mg and for the last 6 months she has a dry cough at night so not sleeping well and it has started happening during the day now. Asking for advice She does not feel that she needs to come in.   [facility]

## 2020-08-17 RX ORDER — LORAZEPAM 0.5 MG/1
TABLET ORAL
Qty: 60 TABLET | Refills: 0 | Status: SHIPPED | OUTPATIENT
Start: 2020-08-17 | End: 2020-09-27

## 2020-09-27 RX ORDER — LORAZEPAM 0.5 MG/1
TABLET ORAL
Qty: 60 TABLET | Refills: 0 | Status: SHIPPED | OUTPATIENT
Start: 2020-09-27 | End: 2020-10-27

## 2020-11-06 RX ORDER — LORAZEPAM 0.5 MG/1
TABLET ORAL
Qty: 60 TABLET | Refills: 0 | Status: SHIPPED | OUTPATIENT
Start: 2020-11-06 | End: 2020-12-16

## 2020-11-06 NOTE — TELEPHONE ENCOUNTER
Last visit: 12/4/19  Last Med refill: 9/27/2020  Does patient have enough medication for 72 hours: Yes    Next Visit Date:  No future appointments.     Health Maintenance   Topic Date Due    Hepatitis C screen  1955    Lipid screen  09/22/1993    Breast cancer screen  09/22/2003    Shingles Vaccine (1 of 2) 09/22/2003    Colon cancer screen colonoscopy  09/22/2003    DEXA (modify frequency per FRAX score)  09/22/2008    Potassium monitoring  06/27/2013    Creatinine monitoring  06/27/2013    Pneumococcal 65+ years Vaccine (1 of 1 - PPSV23) 09/22/2018    Flu vaccine (1) 09/01/2020    DTaP/Tdap/Td vaccine (2 - Td) 05/08/2023    Hepatitis A vaccine  Aged Out    Hepatitis B vaccine  Aged Out    Hib vaccine  Aged Out    Meningococcal (ACWY) vaccine  Aged Out       No results found for: LABA1C          ( goal A1C is < 7)   No results found for: LABMICR  No results found for: LDLCHOLESTEROL, LDLCALC    (goal LDL is <100)   AST (U/L)   Date Value   06/27/2012 17     ALT (U/L)   Date Value   06/27/2012 16     BUN (mg/dL)   Date Value   06/27/2012 19     BP Readings from Last 3 Encounters:   12/04/19 138/80   05/22/19 100/60   06/18/18 120/80          (goal 120/80)    All Future Testing planned in [facility]  Lab Frequency Next Occurrence   Comprehensive Metabolic Panel Once 79/11/1818   Lipid Panel Once 01/19/2020   Hepatitis C Antibody Once 03/27/2020   HIV Screen Once 03/27/2020               Patient Active Problem List:     Foreign body of thumb, left, infected     Lumbosacral neuritis     Allergic rhinitis     H/O total hysterectomy     Hypertension     Anxiety     Neuropathy

## 2020-12-16 RX ORDER — LORAZEPAM 0.5 MG/1
TABLET ORAL
Qty: 60 TABLET | Refills: 0 | Status: SHIPPED | OUTPATIENT
Start: 2020-12-16 | End: 2021-01-20

## 2021-01-20 DIAGNOSIS — F41.9 ANXIETY: ICD-10-CM

## 2021-01-20 RX ORDER — LORAZEPAM 0.5 MG/1
TABLET ORAL
Qty: 60 TABLET | Refills: 0 | Status: SHIPPED | OUTPATIENT
Start: 2021-01-20 | End: 2021-03-12

## 2021-03-01 DIAGNOSIS — F41.9 ANXIETY: ICD-10-CM

## 2021-03-01 NOTE — TELEPHONE ENCOUNTER
Pharm requested refill    Health Maintenance   Topic Date Due    Hepatitis C screen  1955    Lipid screen  09/22/1993    Breast cancer screen  09/22/2003    Shingles Vaccine (1 of 2) 09/22/2003    Colon cancer screen colonoscopy  09/22/2003    DEXA (modify frequency per FRAX score)  09/22/2008    Potassium monitoring  06/27/2013    Creatinine monitoring  06/27/2013    Pneumococcal 65+ years Vaccine (1 of 1 - PPSV23) 09/22/2018    Flu vaccine (1) 09/01/2020    DTaP/Tdap/Td vaccine (2 - Td) 05/08/2023    Hepatitis A vaccine  Aged Out    Hepatitis B vaccine  Aged Out    Hib vaccine  Aged Out    Meningococcal (ACWY) vaccine  Aged Out             (applicable per patient's age: Cancer Screenings, Depression Screening, Fall Risk Screening, Immunizations)    AST (U/L)   Date Value   06/27/2012 17     ALT (U/L)   Date Value   06/27/2012 16     BUN (mg/dL)   Date Value   06/27/2012 19      (goal A1C is < 7)   (goal LDL is <100) need 30-50% reduction from baseline     BP Readings from Last 3 Encounters:   12/04/19 138/80   05/22/19 100/60   06/18/18 120/80    (goal /80)      All Future Testing planned in [facility]:  Lab Frequency Next Occurrence       Next Visit Date:  No future appointments.          Patient Active Problem List:     Foreign body of thumb, left, infected     Lumbosacral neuritis     Allergic rhinitis     H/O total hysterectomy     Hypertension     Anxiety     Neuropathy

## 2021-03-03 RX ORDER — LORAZEPAM 0.5 MG/1
TABLET ORAL
Qty: 60 TABLET | Refills: 0 | OUTPATIENT
Start: 2021-03-03 | End: 2021-04-02

## 2021-03-09 DIAGNOSIS — F32.9 REACTIVE DEPRESSION: ICD-10-CM

## 2021-03-09 NOTE — TELEPHONE ENCOUNTER
LMOVM for Brady Grandchild to call the office. Patient needs to schedule an appointment.  Last seen 12/2019

## 2021-03-09 NOTE — TELEPHONE ENCOUNTER
Pharm requested refill    Health Maintenance   Topic Date Due    Hepatitis C screen  Never done    Lipid screen  Never done    Breast cancer screen  Never done    Shingles Vaccine (1 of 2) Never done    Colon cancer screen colonoscopy  Never done    DEXA (modify frequency per FRAX score)  Never done    Potassium monitoring  06/27/2013    Creatinine monitoring  06/27/2013    Pneumococcal 65+ years Vaccine (1 of 1 - PPSV23) Never done    Flu vaccine (1) Never done    DTaP/Tdap/Td vaccine (2 - Td) 05/08/2023    Hepatitis A vaccine  Aged Out    Hepatitis B vaccine  Aged Out    Hib vaccine  Aged Out    Meningococcal (ACWY) vaccine  Aged Out             (applicable per patient's age: Cancer Screenings, Depression Screening, Fall Risk Screening, Immunizations)    AST (U/L)   Date Value   06/27/2012 17     ALT (U/L)   Date Value   06/27/2012 16     BUN (mg/dL)   Date Value   06/27/2012 19      (goal A1C is < 7)   (goal LDL is <100) need 30-50% reduction from baseline     BP Readings from Last 3 Encounters:   12/04/19 138/80   05/22/19 100/60   06/18/18 120/80    (goal /80)      All Future Testing planned in [facility]:  Lab Frequency Next Occurrence       Next Visit Date:  No future appointments.          Patient Active Problem List:     Foreign body of thumb, left, infected     Lumbosacral neuritis     Allergic rhinitis     H/O total hysterectomy     Hypertension     Anxiety     Neuropathy

## 2021-03-10 RX ORDER — CITALOPRAM 20 MG/1
TABLET ORAL
Qty: 30 TABLET | Refills: 0 | Status: SHIPPED | OUTPATIENT
Start: 2021-03-10 | End: 2021-04-07

## 2021-03-12 DIAGNOSIS — F41.9 ANXIETY: ICD-10-CM

## 2021-03-12 RX ORDER — LORAZEPAM 0.5 MG/1
TABLET ORAL
Qty: 60 TABLET | Refills: 0 | Status: SHIPPED | OUTPATIENT
Start: 2021-03-12 | End: 2021-04-14

## 2021-03-17 ENCOUNTER — VIRTUAL VISIT (OUTPATIENT)
Dept: FAMILY MEDICINE CLINIC | Age: 66
End: 2021-03-17
Payer: COMMERCIAL

## 2021-03-17 DIAGNOSIS — F41.9 ANXIETY: Primary | ICD-10-CM

## 2021-03-17 DIAGNOSIS — M54.17 LUMBOSACRAL NEURITIS: ICD-10-CM

## 2021-03-17 DIAGNOSIS — I10 ESSENTIAL HYPERTENSION: ICD-10-CM

## 2021-03-17 PROCEDURE — 99214 OFFICE O/P EST MOD 30 MIN: CPT | Performed by: FAMILY MEDICINE

## 2021-03-17 SDOH — ECONOMIC STABILITY: TRANSPORTATION INSECURITY
IN THE PAST 12 MONTHS, HAS THE LACK OF TRANSPORTATION KEPT YOU FROM MEDICAL APPOINTMENTS OR FROM GETTING MEDICATIONS?: NO

## 2021-03-17 ASSESSMENT — PATIENT HEALTH QUESTIONNAIRE - PHQ9
SUM OF ALL RESPONSES TO PHQ QUESTIONS 1-9: 1
SUM OF ALL RESPONSES TO PHQ QUESTIONS 1-9: 1
2. FEELING DOWN, DEPRESSED OR HOPELESS: 1
SUM OF ALL RESPONSES TO PHQ QUESTIONS 1-9: 1
SUM OF ALL RESPONSES TO PHQ9 QUESTIONS 1 & 2: 1

## 2021-03-17 ASSESSMENT — ENCOUNTER SYMPTOMS
ALLERGIC/IMMUNOLOGIC NEGATIVE: 1
SHORTNESS OF BREATH: 0
EYES NEGATIVE: 1
ABDOMINAL PAIN: 0
BACK PAIN: 1
COUGH: 0

## 2021-03-17 NOTE — PROGRESS NOTES
pain.   Endocrine: Negative. Genitourinary: Negative for frequency and urgency. Musculoskeletal: Positive for back pain. Skin: Negative. Allergic/Immunologic: Negative. Neurological: Negative for dizziness and headaches. Hematological: Negative. Psychiatric/Behavioral: Negative for sleep disturbance. The patient is nervous/anxious. PAST MEDICAL HISTORY:    Past Medical History:   Diagnosis Date    Allergic rhinitis     Anxiety     Back pain, chronic     Broken teeth     broken front teeth from intubation during general anesthesia [facility])    Foreign body of thumb, left, infected 5/5/2013    Wooden sliver of left thumb (5/5/2013). Treated with antibiotics, responding well.  Hepatitis A infection 1972 or 1973    history of sarthak this in college (from lab rats)    Hypertension     Neuropathy     feet    Osteoarthritis     BACK    Post-operative nausea and vomiting     Status post dental restoration     front teeth replaced by dental implants (posts)    Wears contact lenses        FAMILY HISTORY:    Family History   Problem Relation Age of Onset    High Blood Pressure Father     High Blood Pressure Sister        SOCIAL HISTORY:    Social History     Socioeconomic History    Marital status:      Spouse name: None    Number of children: 3    Years of education: None    Highest education level: None   Occupational History    None   Social Needs    Financial resource strain: Not hard at all   [email or web address] insecurity     Worry: Never true     Inability: Never true   [employer] needs     Medical: No     Non-medical: No   Tobacco Use    Smoking status: Never Smoker    Smokeless tobacco: Never Used   Substance and Sexual Activity    Alcohol use: No     Comment: States that she used to drink socially 20 years ago, but gave it up.     Drug use: No    Sexual activity: Not Currently   Lifestyle    Physical activity     Days per week: None     Minutes per session: None    Stress: None   Relationships    Social connections     Talks on phone: None     Gets together: None     Attends Latter day service: None     Active member of club or organization: None     Attends meetings of clubs or organizations: None     Relationship status: None    Intimate partner violence     Fear of current or ex partner: None     Emotionally abused: None     Physically abused: None     Forced sexual activity: None   Other Topics Concern    None   Social History Narrative    None       SURGICAL HISTORY:    Past Surgical History:   Procedure Laterality Date    APPENDECTOMY  2003    FOREIGN BODY REMOVAL Right 5/17/2013    hand    HYSTERECTOMY  2003    LUMBAR DISC SURGERY      x 3    NERVE BLOCK  11/19/14    duramorph, decadron 10mg    TONSILLECTOMY      TUBAL LIGATION         CURRENT MEDICATIONS:    Current Outpatient Medications   Medication Sig Dispense Refill    LORazepam (ATIVAN) 0.5 MG tablet TAKE ONE TABLET BY MOUTH EVERY 8 HOURS AS NEEDED FOR ANXIETY 60 tablet 0    citalopram (CELEXA) 20 MG tablet TAKE ONE TABLET BY MOUTH DAILY 30 tablet 0     No current facility-administered medications for this visit. ALLERGIES:   Allergies   Allergen Reactions    Bactrim [Sulfamethoxazole-Trimethoprim] Swelling     Facial swelling    Lisinopril Other (See Comments)     Cough, keeping her awake at night       PHYSICAL EXAM:   Physical Exam  Neurological:      Mental Status: She is alert and oriented to person, place, and time. Psychiatric:         Mood and Affect: Mood normal.         Behavior: Behavior normal.         Thought Content: Thought content normal.         Judgment: Judgment normal.          ASSESSMENT/PLAN:  1. Anxiety  Chronic and stable on current medications. Continue same. 2. Essential hypertension  Not currently being treated. Encouraged her to make an office appointment for blood pressure check and possible restarting medication.   She did not tolerate lisinopril so would need to choose another option. 3. Lumbosacral neuritis  Chronic pain that is not well controlled. She is no longer able to see the neurologist who is treating her with Percocet 1-3 daily. Pain is limiting her activity and interrupting her sleep. I discussed that I would be willing to restart the Percocet if she makes an office appointment for pain contract and urine drug screen which she is agreeable to. Return in about 4 weeks (around 4/14/2021) for htn, back pain. Chava Galo is a 77 y.o. female being evaluated by a Virtual Visit (video visit) encounter to address concerns as mentioned above. A caregiver was present when appropriate. Due to this being a TeleHealth encounter ([facility] public health emergency), evaluation of the following organ systems was limited: Vitals/Constitutional/EENT/Resp/CV/GI//MS/Neuro/Skin/Heme-Lymph-Imm. Pursuant to the emergency declaration under the [address] [place] [address] authority and the [employer] and [employer] Act, this Virtual Visit was conducted with patient's (and/or legal guardian's) consent, to reduce the patient's risk of exposure to COVID-19 and provide necessary medical care. The patient (and/or legal guardian) has also been advised to contact this office for worsening conditions or problems, and seek emergency medical treatment and/or call 911 if deemed necessary. Services were provided through a video synchronous discussion virtually to substitute for in-person clinic visit. Patient and provider were located at their individual homes. --Selina Jones MD on 3/17/2021 at 11:24 AM    An electronic signature was used to authenticate this note.

## 2021-03-18 RX ORDER — LISINOPRIL AND HYDROCHLOROTHIAZIDE 12.5; 1 MG/1; MG/1
TABLET ORAL
Qty: 90 TABLET | Refills: 3 | Status: SHIPPED
Start: 2021-03-18 | End: 2021-04-05 | Stop reason: SINTOL

## 2021-04-05 ENCOUNTER — HOSPITAL ENCOUNTER (OUTPATIENT)
Age: 66
Setting detail: SPECIMEN
Discharge: HOME | End: 2021-04-05
Payer: COMMERCIAL

## 2021-04-05 ENCOUNTER — OFFICE VISIT (OUTPATIENT)
Dept: FAMILY MEDICINE CLINIC | Age: 66
End: 2021-04-05
Payer: COMMERCIAL

## 2021-04-05 VITALS
BODY MASS INDEX: 28.45 KG/M2 | DIASTOLIC BLOOD PRESSURE: 99 MMHG | SYSTOLIC BLOOD PRESSURE: 148 MMHG | HEIGHT: 66 IN | OXYGEN SATURATION: 98 % | HEART RATE: 75 BPM | WEIGHT: 177 LBS | TEMPERATURE: 97.2 F

## 2021-04-05 DIAGNOSIS — G62.9 NEUROPATHY: ICD-10-CM

## 2021-04-05 DIAGNOSIS — M54.17 LUMBOSACRAL NEURITIS: ICD-10-CM

## 2021-04-05 DIAGNOSIS — I10 ESSENTIAL HYPERTENSION: Primary | ICD-10-CM

## 2021-04-05 DIAGNOSIS — M54.50 CHRONIC MIDLINE LOW BACK PAIN WITHOUT SCIATICA: ICD-10-CM

## 2021-04-05 DIAGNOSIS — F41.9 ANXIETY: ICD-10-CM

## 2021-04-05 DIAGNOSIS — G89.29 CHRONIC MIDLINE LOW BACK PAIN WITHOUT SCIATICA: ICD-10-CM

## 2021-04-05 LAB
ALBUMIN SERPL-MCNC: 4.7 G/DL (ref 3.5–5.2)
ALBUMIN/GLOBULIN RATIO: 1.5 (ref 1–2.5)
ALP BLD-CCNC: 84 U/L (ref 35–104)
ALT SERPL-CCNC: 18 U/L (ref 5–33)
ANION GAP SERPL CALCULATED.3IONS-SCNC: 16 MMOL/L (ref 9–17)
AST SERPL-CCNC: 22 U/L
BILIRUB SERPL-MCNC: 0.92 MG/DL (ref 0.3–1.2)
BUN BLDV-MCNC: 19 MG/DL (ref 8–23)
BUN/CREAT BLD: ABNORMAL (ref 9–20)
CALCIUM SERPL-MCNC: 9.7 MG/DL (ref 8.6–10.4)
CHLORIDE BLD-SCNC: 99 MMOL/L (ref 98–107)
CHOLESTEROL/HDL RATIO: 5.2
CHOLESTEROL: 219 MG/DL
CO2: 24 MMOL/L (ref 20–31)
CREAT SERPL-MCNC: 0.99 MG/DL (ref 0.5–0.9)
GFR AFRICAN AMERICAN: >60 ML/MIN
GFR NON-AFRICAN AMERICAN: 56 ML/MIN
GFR SERPL CREATININE-BSD FRML MDRD: ABNORMAL ML/MIN/{1.73_M2}
GFR SERPL CREATININE-BSD FRML MDRD: ABNORMAL ML/MIN/{1.73_M2}
GLUCOSE BLD-MCNC: 96 MG/DL (ref 70–99)
HDLC SERPL-MCNC: 42 MG/DL
HEPATITIS C ANTIBODY: NONREACTIVE
HIV AG/AB: NONREACTIVE
LDL CHOLESTEROL: 137 MG/DL (ref 0–130)
POTASSIUM SERPL-SCNC: 5 MMOL/L (ref 3.7–5.3)
SODIUM BLD-SCNC: 139 MMOL/L (ref 135–144)
TOTAL PROTEIN: 7.8 G/DL (ref 6.4–8.3)
TRIGL SERPL-MCNC: 200 MG/DL
VLDLC SERPL CALC-MCNC: ABNORMAL MG/DL (ref 1–30)

## 2021-04-05 PROCEDURE — 36415 COLL VENOUS BLD VENIPUNCTURE: CPT | Performed by: FAMILY MEDICINE

## 2021-04-05 PROCEDURE — 99214 OFFICE O/P EST MOD 30 MIN: CPT | Performed by: FAMILY MEDICINE

## 2021-04-05 RX ORDER — CHLORHEXIDINE GLUCONATE 4 %
LIQUID (ML) TOPICAL
COMMUNITY
End: 2021-07-08

## 2021-04-05 RX ORDER — HYDROCHLOROTHIAZIDE 12.5 MG/1
12.5 TABLET ORAL DAILY
Qty: 30 TABLET | Refills: 5 | Status: SHIPPED | OUTPATIENT
Start: 2021-04-05 | End: 2021-09-20

## 2021-04-05 RX ORDER — OXYCODONE HYDROCHLORIDE AND ACETAMINOPHEN 5; 325 MG/1; MG/1
1 TABLET ORAL EVERY 8 HOURS PRN
Qty: 90 TABLET | Refills: 0 | Status: SHIPPED | OUTPATIENT
Start: 2021-04-05 | End: 2021-05-10 | Stop reason: SDUPTHER

## 2021-04-05 ASSESSMENT — ENCOUNTER SYMPTOMS
DIARRHEA: 0
COUGH: 0
BACK PAIN: 1
EYES NEGATIVE: 1
ABDOMINAL PAIN: 0
BLOOD IN STOOL: 0
CONSTIPATION: 0
ALLERGIC/IMMUNOLOGIC NEGATIVE: 1
SHORTNESS OF BREATH: 0

## 2021-04-05 NOTE — PROGRESS NOTES
[facility]  [facility]  [address]  [facility] [address]  Dept: [phone]    4/5/2021    CHIEF COMPLAINT    Chief Complaint   Patient presents with    Hypertension    Back Pain       HPI    Cody Landon is a 77 y.o. female who presents   Chief Complaint   Patient presents with    Hypertension    Back Pain   . Recheck chronic back pain. Had been going to neurologist who was providing her with percocet which she took2-3 times daily. Pain is in low back and limits her activity. Has to stop and lie flat when doing certain activities. Tried Neurontin that was not helpful. Has had a nerve block that was not effective. Taking 6-8 tylenol daily. Her neurologist has left practice so she has not had percocet since 2019. Taking melatonin for sleep but is still restless due to pain. Has had a fracture of lumbar spine and 3 surgeries. Stopped lisinopril-hctz due to a cough. Hypertension  This is a chronic problem. The current episode started more than 1 year ago. The problem has been waxing and waning since onset. Pertinent negatives include no chest pain, headaches or shortness of breath. Risk factors for coronary artery disease include post-menopausal state. Past treatments include diuretics and ACE inhibitors. The current treatment provides moderate improvement. There are no compliance problems. Back Pain  This is a chronic problem. The current episode started more than 1 year ago. The problem occurs daily. The problem is unchanged. The pain does not radiate. The pain is severe. Associated symptoms include numbness (feet). Pertinent negatives include no abdominal pain, chest pain, fever or headaches. Risk factors include menopause. Treatments tried: tylenol. The treatment provided mild relief.        Vitals:    04/05/21 1050   BP: (!) 148/99   Pulse: 75   Temp: 97.2 °F (36.2 °C)   SpO2: 98%   Weight: 177 lb (80.3 kg)   Height: 5' 6\" (1.676 m) REVIEW OF SYSTEMS    Review of Systems   Constitutional: Negative for fatigue, fever and unexpected weight change. HENT: Negative. Eyes: Negative. Respiratory: Negative for cough and shortness of breath. Cardiovascular: Negative for chest pain and leg swelling. Gastrointestinal: Negative for abdominal pain, blood in stool, constipation and diarrhea. Endocrine: Negative. Genitourinary: Negative for frequency and urgency. Musculoskeletal: Positive for back pain. See hpi   Skin: Negative. Allergic/Immunologic: Negative. Neurological: Positive for numbness (feet). Negative for dizziness and headaches. Hematological: Negative. Psychiatric/Behavioral: Positive for sleep disturbance. The patient is nervous/anxious. PAST MEDICAL HISTORY    Past Medical History:   Diagnosis Date    Allergic rhinitis     Anxiety     Back pain, chronic     Broken teeth     broken front teeth from intubation during general anesthesia [facility])    Foreign body of thumb, left, infected 5/5/2013    Wooden sliver of left thumb (5/5/2013). Treated with antibiotics, responding well.  Hepatitis A infection 1972 or 1973    history of sarthak this in college (from lab rats)    Hypertension     Neuropathy     feet    Osteoarthritis     BACK    Post-operative nausea and vomiting     Status post dental restoration     front teeth replaced by dental implants (posts)    Wears contact lenses        FAMILY HISTORY    Family History   Problem Relation Age of Onset    High Blood Pressure Father     High Blood Pressure Sister        SOCIAL HISTORY    Social History     Socioeconomic History    Marital status:       Spouse name: None    Number of children: 3    Years of education: None    Highest education level: None   Occupational History    None   Social Needs    Financial resource strain: Not hard at all   [employer] insecurity     Worry: Never true     Inability: Never true    Transportation needs     Medical: No     Non-medical: No   Tobacco Use    Smoking status: Never Smoker    Smokeless tobacco: Never Used   Substance and Sexual Activity    Alcohol use: No     Comment: States that she used to drink socially 20 years ago, but gave it up.  Drug use: No    Sexual activity: Not Currently   Lifestyle    Physical activity     Days per week: None     Minutes per session: None    Stress: None   Relationships    Social connections     Talks on phone: None     Gets together: None     Attends Mormon service: None     Active member of club or organization: None     Attends meetings of clubs or organizations: None     Relationship status: None    Intimate partner violence     Fear of current or ex partner: None     Emotionally abused: None     Physically abused: None     Forced sexual activity: None   Other Topics Concern    None   Social History Narrative    None       SURGICAL HISTORY    Past Surgical History:   Procedure Laterality Date    APPENDECTOMY  2003    FOREIGN BODY REMOVAL Right 5/17/2013    hand    HYSTERECTOMY  2003    LUMBAR DISC SURGERY      x 3    NERVE BLOCK  11/19/14    duramorph, decadron 10mg    TONSILLECTOMY      TUBAL LIGATION         CURRENT MEDICATIONS    Current Outpatient Medications   Medication Sig Dispense Refill    Melatonin 12 MG TABS Take by mouth      oxyCODONE-acetaminophen (PERCOCET) 5-325 MG per tablet Take 1 tablet by mouth every 8 hours as needed for Pain for up to 30 days. 90 tablet 0    hydroCHLOROthiazide (HYDRODIURIL) 12.5 MG tablet Take 1 tablet by mouth daily 30 tablet 5    LORazepam (ATIVAN) 0.5 MG tablet TAKE ONE TABLET BY MOUTH EVERY 8 HOURS AS NEEDED FOR ANXIETY 60 tablet 0    citalopram (CELEXA) 20 MG tablet TAKE ONE TABLET BY MOUTH DAILY 30 tablet 0     No current facility-administered medications for this visit.         ALLERGIES    Allergies   Allergen Reactions    Bactrim [Sulfamethoxazole-Trimethoprim] Swelling     Facial swelling    Lisinopril Other (See Comments)     Cough, keeping her awake at night       PHYSICAL EXAM   Physical Exam  Vitals signs reviewed. Constitutional:       Appearance: She is well-developed. HENT:      Head: Normocephalic. Eyes:      Conjunctiva/sclera: Conjunctivae normal.      Pupils: Pupils are equal, round, and reactive to light. Neck:      Musculoskeletal: Normal range of motion and neck supple. Thyroid: No thyromegaly. Cardiovascular:      Rate and Rhythm: Normal rate and regular rhythm. Heart sounds: Normal heart sounds. No murmur. Pulmonary:      Effort: Pulmonary effort is normal.      Breath sounds: Normal breath sounds. No wheezing or rales. Abdominal:      Palpations: Abdomen is soft. Tenderness: There is no abdominal tenderness. There is no guarding or rebound. Musculoskeletal: Normal range of motion. General: Tenderness (lumbar spine) present. No deformity. Lymphadenopathy:      Cervical: No cervical adenopathy. Skin:     General: Skin is warm and dry. Neurological:      Mental Status: She is alert and oriented to person, place, and time. Psychiatric:         Mood and Affect: Mood normal.         Behavior: Behavior normal.         Thought Content: Thought content normal.         Judgment: Judgment normal.         ASSESSMENT/PLAN  1. Essential hypertension  Start hydrochlorothiazide. No more lisinopril due to cough monitor blood pressure at home  - hydroCHLOROthiazide (HYDRODIURIL) 12.5 MG tablet; Take 1 tablet by mouth daily  Dispense: 30 tablet; Refill: 5    2. Lumbosacral neuritis  Restart Percocet which she did take in the past with good effectiveness. Discussed that she needs to reduce acetaminophen intake as there is already acetaminophen in the Percocet. She may take Aleve or Advil between doses or if having a day of some more soreness  - oxyCODONE-acetaminophen (PERCOCET) 5-325 MG per tablet;  Take 1 tablet by mouth every 8 hours as needed for Pain for up to 30 days. Dispense: 90 tablet; Refill: 0    3. Chronic midline low back pain without sciatica  Chronic, as above  - oxyCODONE-acetaminophen (PERCOCET) 5-325 MG per tablet; Take 1 tablet by mouth every 8 hours as needed for Pain for up to 30 days. Dispense: 90 tablet; Refill: 0  Controlled substances monitoring: possible medication side effects, risk of tolerance and/or dependence, and alternative treatments discussed, no signs of potential drug abuse or diversion identified, [place]ARRS report reviewed today- activity consistent with treatment plan and medication contract signed today.  ]  4. Anxiety  Chronic and stable with current medication. 5. Neuropathy  Continue to protect feet. Neurontin was tried and ineffective in the past.       Christinmilind Salazar received counseling on the following healthy behaviors: nutrition, exercise and medication adherence  Reviewed prior labs and health maintenance. Continue current medications, diet and exercise. Discussed use, benefit, and side effects of prescribed medications. Barriers to medication compliance addressed. Patient given educational materials - see patient instructions. All patient questions answered. Patient voiced understanding. Return in about 3 months (around 7/5/2021) for back pain, htn.         Electronically signed by Rudy Salgado MD on 4/5/21 at 10:58 AM EDT

## 2021-04-07 DIAGNOSIS — F32.9 REACTIVE DEPRESSION: ICD-10-CM

## 2021-04-07 RX ORDER — CITALOPRAM 20 MG/1
TABLET ORAL
Qty: 30 TABLET | Refills: 5 | Status: SHIPPED | OUTPATIENT
Start: 2021-04-07 | End: 2021-10-03

## 2021-04-14 DIAGNOSIS — F41.9 ANXIETY: ICD-10-CM

## 2021-04-14 RX ORDER — LORAZEPAM 0.5 MG/1
TABLET ORAL
Qty: 60 TABLET | Refills: 0 | Status: SHIPPED | OUTPATIENT
Start: 2021-04-14 | End: 2021-05-10

## 2021-05-10 DIAGNOSIS — F41.9 ANXIETY: ICD-10-CM

## 2021-05-10 DIAGNOSIS — G89.29 CHRONIC MIDLINE LOW BACK PAIN WITHOUT SCIATICA: ICD-10-CM

## 2021-05-10 DIAGNOSIS — M54.17 LUMBOSACRAL NEURITIS: ICD-10-CM

## 2021-05-10 DIAGNOSIS — M54.50 CHRONIC MIDLINE LOW BACK PAIN WITHOUT SCIATICA: ICD-10-CM

## 2021-05-10 RX ORDER — OXYCODONE HYDROCHLORIDE AND ACETAMINOPHEN 5; 325 MG/1; MG/1
1 TABLET ORAL EVERY 8 HOURS PRN
Qty: 90 TABLET | Refills: 0 | Status: SHIPPED | OUTPATIENT
Start: 2021-05-10 | End: 2021-06-10 | Stop reason: SDUPTHER

## 2021-05-10 RX ORDER — LORAZEPAM 0.5 MG/1
TABLET ORAL
Qty: 60 TABLET | Refills: 0 | Status: SHIPPED | OUTPATIENT
Start: 2021-05-10 | End: 2021-06-10 | Stop reason: SDUPTHER

## 2021-05-10 NOTE — TELEPHONE ENCOUNTER
Pharm/patient requested    Health Maintenance   Topic Date Due    COVID-19 Vaccine (1) Never done    Breast cancer screen  Never done    Shingles Vaccine (1 of 2) Never done    Colon cancer screen colonoscopy  Never done    DEXA (modify frequency per FRAX score)  Never done    Pneumococcal 65+ years Vaccine (1 of 1 - PPSV23) Never done    Flu vaccine (Season Ended) 09/01/2021    Potassium monitoring  04/05/2022    Creatinine monitoring  04/05/2022    DTaP/Tdap/Td vaccine (2 - Td) 05/08/2023    Lipid screen  04/05/2026    Hepatitis C screen  Completed    Hepatitis A vaccine  Aged Out    Hepatitis B vaccine  Aged Out    Hib vaccine  Aged Out    Meningococcal (ACWY) vaccine  Aged Out             (applicable per patient's age: Cancer Screenings, Depression Screening, Fall Risk Screening, Immunizations)    LDL Cholesterol (mg/dL)   Date Value   04/05/2021 137 (H)     AST (U/L)   Date Value   04/05/2021 22     ALT (U/L)   Date Value   04/05/2021 18     BUN (mg/dL)   Date Value   04/05/2021 19      (goal A1C is < 7)   (goal LDL is <100) need 30-50% reduction from baseline     BP Readings from Last 3 Encounters:   04/05/21 (!) 148/99   12/04/19 138/80   05/22/19 100/60    (goal /80)      All Future Testing planned in [facility]:  Lab Frequency Next Occurrence       Next Visit Date:  Future Appointments   Date Time Provider Yonatan Moreno   7/8/2021 11:00 AM MD ina Toscano fp [id number]            Patient Active Problem List:     Foreign body of thumb, left, infected     Lumbosacral neuritis     Allergic rhinitis     H/O total hysterectomy     Hypertension     Anxiety     Neuropathy     Mixed hyperlipidemia

## 2021-06-09 DIAGNOSIS — F41.9 ANXIETY: ICD-10-CM

## 2021-06-10 DIAGNOSIS — F41.9 ANXIETY: ICD-10-CM

## 2021-06-10 DIAGNOSIS — M54.17 LUMBOSACRAL NEURITIS: ICD-10-CM

## 2021-06-10 DIAGNOSIS — G89.29 CHRONIC MIDLINE LOW BACK PAIN WITHOUT SCIATICA: ICD-10-CM

## 2021-06-10 DIAGNOSIS — M54.50 CHRONIC MIDLINE LOW BACK PAIN WITHOUT SCIATICA: ICD-10-CM

## 2021-06-10 RX ORDER — OXYCODONE HYDROCHLORIDE AND ACETAMINOPHEN 5; 325 MG/1; MG/1
1 TABLET ORAL EVERY 8 HOURS PRN
Qty: 90 TABLET | Refills: 0 | Status: SHIPPED | OUTPATIENT
Start: 2021-06-10 | End: 2021-07-08 | Stop reason: SDUPTHER

## 2021-06-10 RX ORDER — LORAZEPAM 0.5 MG/1
TABLET ORAL
Qty: 60 TABLET | Refills: 0 | OUTPATIENT
Start: 2021-06-10 | End: 2021-07-10

## 2021-06-10 RX ORDER — LORAZEPAM 0.5 MG/1
0.5 TABLET ORAL EVERY 8 HOURS PRN
Qty: 60 TABLET | Refills: 0 | Status: SHIPPED | OUTPATIENT
Start: 2021-06-10 | End: 2021-07-08 | Stop reason: SDUPTHER

## 2021-06-10 NOTE — TELEPHONE ENCOUNTER
Refills:  LORazepam (ATIVAN) 0.5 MG tablet    oxyCODONE-acetaminophen (PERCOCET) 5-325 MG per tablet     Pharmacy    [address] [phone] - F [phone]

## 2021-07-08 ENCOUNTER — OFFICE VISIT (OUTPATIENT)
Dept: FAMILY MEDICINE CLINIC | Age: 66
End: 2021-07-08
Payer: COMMERCIAL

## 2021-07-08 VITALS
OXYGEN SATURATION: 96 % | WEIGHT: 174 LBS | HEIGHT: 66 IN | DIASTOLIC BLOOD PRESSURE: 86 MMHG | HEART RATE: 66 BPM | TEMPERATURE: 97.6 F | RESPIRATION RATE: 16 BRPM | SYSTOLIC BLOOD PRESSURE: 138 MMHG | BODY MASS INDEX: 27.97 KG/M2

## 2021-07-08 DIAGNOSIS — I10 ESSENTIAL HYPERTENSION: Primary | ICD-10-CM

## 2021-07-08 DIAGNOSIS — G62.9 NEUROPATHY: ICD-10-CM

## 2021-07-08 DIAGNOSIS — F41.9 ANXIETY: ICD-10-CM

## 2021-07-08 DIAGNOSIS — M54.50 CHRONIC MIDLINE LOW BACK PAIN WITHOUT SCIATICA: ICD-10-CM

## 2021-07-08 DIAGNOSIS — F32.9 REACTIVE DEPRESSION: ICD-10-CM

## 2021-07-08 DIAGNOSIS — M54.17 LUMBOSACRAL NEURITIS: ICD-10-CM

## 2021-07-08 DIAGNOSIS — G89.29 CHRONIC MIDLINE LOW BACK PAIN WITHOUT SCIATICA: ICD-10-CM

## 2021-07-08 PROCEDURE — 99214 OFFICE O/P EST MOD 30 MIN: CPT | Performed by: FAMILY MEDICINE

## 2021-07-08 RX ORDER — LORAZEPAM 0.5 MG/1
0.5 TABLET ORAL EVERY 8 HOURS PRN
Qty: 60 TABLET | Refills: 0 | Status: SHIPPED | OUTPATIENT
Start: 2021-07-08 | End: 2021-08-09

## 2021-07-08 RX ORDER — OXYCODONE HYDROCHLORIDE AND ACETAMINOPHEN 5; 325 MG/1; MG/1
1 TABLET ORAL EVERY 8 HOURS PRN
Qty: 90 TABLET | Refills: 0 | Status: SHIPPED | OUTPATIENT
Start: 2021-07-08 | End: 2021-08-09 | Stop reason: SDUPTHER

## 2021-07-08 SDOH — ECONOMIC STABILITY: FOOD INSECURITY: WITHIN THE PAST 12 MONTHS, YOU WORRIED THAT YOUR FOOD WOULD RUN OUT BEFORE YOU GOT MONEY TO BUY MORE.: NEVER TRUE

## 2021-07-08 SDOH — ECONOMIC STABILITY: TRANSPORTATION INSECURITY
IN THE PAST 12 MONTHS, HAS LACK OF TRANSPORTATION KEPT YOU FROM MEETINGS, WORK, OR FROM GETTING THINGS NEEDED FOR DAILY LIVING?: NO

## 2021-07-08 SDOH — ECONOMIC STABILITY: FOOD INSECURITY: WITHIN THE PAST 12 MONTHS, THE FOOD YOU BOUGHT JUST DIDN'T LAST AND YOU DIDN'T HAVE MONEY TO GET MORE.: NEVER TRUE

## 2021-07-08 ASSESSMENT — ENCOUNTER SYMPTOMS
CONSTIPATION: 0
ABDOMINAL PAIN: 0
BACK PAIN: 1
EYES NEGATIVE: 1
SHORTNESS OF BREATH: 0
ALLERGIC/IMMUNOLOGIC NEGATIVE: 1
DIARRHEA: 0
BLOOD IN STOOL: 0
COUGH: 0

## 2021-07-08 ASSESSMENT — PATIENT HEALTH QUESTIONNAIRE - PHQ9
1. LITTLE INTEREST OR PLEASURE IN DOING THINGS: 0
SUM OF ALL RESPONSES TO PHQ QUESTIONS 1-9: 0
2. FEELING DOWN, DEPRESSED OR HOPELESS: 0
SUM OF ALL RESPONSES TO PHQ9 QUESTIONS 1 & 2: 0
SUM OF ALL RESPONSES TO PHQ QUESTIONS 1-9: 0
SUM OF ALL RESPONSES TO PHQ QUESTIONS 1-9: 0

## 2021-07-08 ASSESSMENT — SOCIAL DETERMINANTS OF HEALTH (SDOH): HOW HARD IS IT FOR YOU TO PAY FOR THE VERY BASICS LIKE FOOD, HOUSING, MEDICAL CARE, AND HEATING?: NOT HARD AT ALL

## 2021-07-08 NOTE — PROGRESS NOTES
Depression screening done  Financial Resource Strain done  Chief Complaint   Patient presents with    Hypertension    Health Maintenance     Patient has declined to have any testing    Medication Check     Percocet & Lorazepam   BP re-taken 138/86

## 2021-07-08 NOTE — PROGRESS NOTES
[facility]  [facility]  [address]  [place] [address]  Dept: [phone]    7/8/2021    CHIEF COMPLAINT    Chief Complaint   Patient presents with    Hypertension    Health Maintenance     Patient has declined to have any testing    Medication Check     Percocet & Lorazepam       HPI    Rene Duke is a 77 y.o. female who presents   Chief Complaint   Patient presents with    Hypertension    Health Maintenance     Patient has declined to have any testing    Medication Check     Percocet & Lorazepam   .  Recheck chronic conditions including hypertension, anxiety, depression, osteoarthritis, chronic back pain and neuropathy of both feet. Taking medications as prescribed with no side effects. She recently started taking Percocet for pain which has significantly improved her ability to participate in activities of daily living including taking care grandchildren. Hypertension  This is a chronic problem. The current episode started more than 1 year ago. The problem is controlled. Associated symptoms include anxiety. Pertinent negatives include no chest pain, headaches, malaise/fatigue, peripheral edema or shortness of breath. Risk factors for coronary artery disease include post-menopausal state. Past treatments include diuretics and lifestyle changes. The current treatment provides moderate improvement. There are no compliance problems. Vitals:    07/08/21 1102 07/08/21 1122   BP: (!) 140/88 138/86   Site: Left Upper Arm Left Upper Arm   Position: Sitting Sitting   Cuff Size: Large Adult Large Adult   Pulse: 66    Resp: 16    Temp: 97.6 °F (36.4 °C)    TempSrc: Temporal    SpO2: 96%    Weight: 174 lb (78.9 kg)    Height: 5' 6\" (1.676 m)        REVIEW OF SYSTEMS    Review of Systems   Constitutional: Negative for fatigue, fever, malaise/fatigue and unexpected weight change. HENT: Negative. Eyes: Negative.     Respiratory: Negative for cough and shortness of breath. Cardiovascular: Negative for chest pain and leg swelling. Gastrointestinal: Negative for abdominal pain, blood in stool, constipation and diarrhea. Endocrine: Negative. Genitourinary: Negative for frequency and urgency. Musculoskeletal: Positive for arthralgias and back pain. Chronic back pain and osteoarthritis   Skin: Negative. Allergic/Immunologic: Negative. Neurological: Positive for numbness (  Both feet distally). Negative for dizziness and headaches. Hematological: Negative. Psychiatric/Behavioral: Positive for dysphoric mood. Negative for sleep disturbance. The patient is nervous/anxious. Stable on med       PAST MEDICAL HISTORY    Past Medical History:   Diagnosis Date    Allergic rhinitis     Anxiety     Back pain, chronic     Broken teeth     broken front teeth from intubation during general anesthesia [facility])    Foreign body of thumb, left, infected 05/05/2013    Wooden sliver of left thumb (5/5/2013). Treated with antibiotics, responding well.  Hepatitis A infection 1972 or 1973    history of sarthak this in college (from lab rats)    Hypertension     Mixed hyperlipidemia     Neuropathy     feet    Osteoarthritis     BACK    Post-operative nausea and vomiting     Status post dental restoration     front teeth replaced by dental implants (posts)    Wears contact lenses        FAMILY HISTORY    Family History   Problem Relation Age of Onset    High Blood Pressure Father     High Blood Pressure Sister        SOCIAL HISTORY    Social History     Socioeconomic History    Marital status:       Spouse name: None    Number of children: 3    Years of education: None    Highest education level: None   Occupational History    None   Tobacco Use    Smoking status: Never Smoker    Smokeless tobacco: Never Used   Substance and Sexual Activity    Alcohol use: No     Comment: States that she used to drink socially 20 years ago, but gave it up.  Drug use: No    Sexual activity: Not Currently   Other Topics Concern    None   Social History Narrative    None     Social Determinants of Health     Financial Resource Strain: Low Risk     Difficulty of Paying Living Expenses: Not hard at all   Food Insecurity: No Food Insecurity    Worried About Running Out of Food in the Last Year: Never true    Mariela of Food in the Last Year: Never true   Transportation Needs: No Transportation Needs    Lack of Transportation (Medical): No    Lack of Transportation (Non-Medical): No   Physical Activity:     Days of Exercise per Week:     Minutes of Exercise per Session:    Stress:     Feeling of Stress :    Social Connections:     Frequency of Communication with Friends and Family:     Frequency of Social Gatherings with Friends and Family:     Attends Christian Services:     Active Member of Clubs or Organizations:     Attends Club or Organization Meetings:     Marital Status:    Intimate Partner Violence:     Fear of Current or Ex-Partner:     Emotionally Abused:     Physically Abused:     Sexually Abused:        SURGICAL HISTORY    Past Surgical History:   Procedure Laterality Date    APPENDECTOMY  2003    FOREIGN BODY REMOVAL Right 5/17/2013    hand    HYSTERECTOMY  2003    LUMBAR DISC SURGERY      x 3    NERVE BLOCK  11/19/14    duramorph, decadron 10mg    TONSILLECTOMY      TUBAL LIGATION         CURRENT MEDICATIONS    Current Outpatient Medications   Medication Sig Dispense Refill    LORazepam (ATIVAN) 0.5 MG tablet Take 1 tablet by mouth every 8 hours as needed for Anxiety for up to 30 days. 60 tablet 0    oxyCODONE-acetaminophen (PERCOCET) 5-325 MG per tablet Take 1 tablet by mouth every 8 hours as needed for Pain for up to 30 days.  90 tablet 0    citalopram (CELEXA) 20 MG tablet TAKE ONE TABLET BY MOUTH DAILY 30 tablet 5    hydroCHLOROthiazide (HYDRODIURIL) 12.5 MG tablet Take 1 tablet by mouth daily 30 tablet 5     No current facility-administered medications for this visit. ALLERGIES    Allergies   Allergen Reactions    Bactrim [Sulfamethoxazole-Trimethoprim] Swelling     Facial swelling    Lisinopril Other (See Comments)     Cough, keeping her awake at night       PHYSICAL EXAM   Physical Exam  Vitals reviewed. Constitutional:       Appearance: She is well-developed. HENT:      Head: Normocephalic. Eyes:      Conjunctiva/sclera: Conjunctivae normal.      Pupils: Pupils are equal, round, and reactive to light. Neck:      Thyroid: No thyromegaly. Cardiovascular:      Rate and Rhythm: Normal rate and regular rhythm. Pulses:           Dorsalis pedis pulses are 2+ on the right side and 2+ on the left side. Posterior tibial pulses are 2+ on the right side and 2+ on the left side. Heart sounds: Normal heart sounds. No murmur heard. Pulmonary:      Effort: Pulmonary effort is normal.      Breath sounds: Normal breath sounds. No wheezing or rales. Abdominal:      Palpations: Abdomen is soft. Tenderness: There is no abdominal tenderness. There is no guarding or rebound. Musculoskeletal:         General: No tenderness or deformity. Normal range of motion. Cervical back: Normal range of motion and neck supple. Lymphadenopathy:      Cervical: No cervical adenopathy. Skin:     General: Skin is warm and dry. Neurological:      Mental Status: She is alert and oriented to person, place, and time. Psychiatric:         Mood and Affect: Mood normal.         Behavior: Behavior normal.         Thought Content: Thought content normal.         Judgment: Judgment normal.         ASSESSMENT/PLAN  1. Essential hypertension  Chronic and stable. Continue healthy lifestyle    2. Anxiety  Continue medication as needed  - LORazepam (ATIVAN) 0.5 MG tablet; Take 1 tablet by mouth every 8 hours as needed for Anxiety for up to 30 days. Dispense: 60 tablet;  Refill: 0    3. Reactive depression  Continue Celexa. Chronic and stable condition    4. Lumbosacral neuritis  Continue pain medication which has been effective  - oxyCODONE-acetaminophen (PERCOCET) 5-325 MG per tablet; Take 1 tablet by mouth every 8 hours as needed for Pain for up to 30 days. Dispense: 90 tablet; Refill: 0    5. Chronic midline low back pain without sciatica  Continue medication and activity as tolerated  - oxyCODONE-acetaminophen (PERCOCET) 5-325 MG per tablet; Take 1 tablet by mouth every 8 hours as needed for Pain for up to 30 days. Dispense: 90 tablet; Refill: 0    6. Neuropathy  Continue to protect feet and examined daily for injury       Paramjit Chandler received counseling on the following healthy behaviors: nutrition, exercise and medication adherence  Reviewed prior labs and health maintenance. Continue current medications, diet and exercise. Discussed use, benefit, and side effects of prescribed medications. Barriers to medication compliance addressed. Patient given educational materials - see patient instructions. All patient questions answered. Patient voiced understanding. Return in about 3 months (around 10/8/2021) for chronic pain, htn.         Electronically signed by Riana Edwrads MD on 7/8/21 at 11:10 AM EDT

## 2021-08-08 DIAGNOSIS — F41.9 ANXIETY: ICD-10-CM

## 2021-08-09 DIAGNOSIS — G89.29 CHRONIC MIDLINE LOW BACK PAIN WITHOUT SCIATICA: ICD-10-CM

## 2021-08-09 DIAGNOSIS — M54.50 CHRONIC MIDLINE LOW BACK PAIN WITHOUT SCIATICA: ICD-10-CM

## 2021-08-09 DIAGNOSIS — M54.17 LUMBOSACRAL NEURITIS: ICD-10-CM

## 2021-08-09 RX ORDER — LORAZEPAM 0.5 MG/1
TABLET ORAL
Qty: 60 TABLET | Refills: 0 | Status: SHIPPED | OUTPATIENT
Start: 2021-08-09 | End: 2021-09-07 | Stop reason: SDUPTHER

## 2021-08-09 RX ORDER — OXYCODONE HYDROCHLORIDE AND ACETAMINOPHEN 5; 325 MG/1; MG/1
1 TABLET ORAL EVERY 8 HOURS PRN
Qty: 90 TABLET | Refills: 0 | Status: SHIPPED | OUTPATIENT
Start: 2021-08-09 | End: 2021-09-07 | Stop reason: SDUPTHER

## 2021-08-09 NOTE — TELEPHONE ENCOUNTER
Refills:    oxyCODONE-acetaminophen (PERCOCET) 5-325 MG per tablet    Pharmacy    [address] [phone]

## 2021-09-07 DIAGNOSIS — M54.50 CHRONIC MIDLINE LOW BACK PAIN WITHOUT SCIATICA: ICD-10-CM

## 2021-09-07 DIAGNOSIS — F41.9 ANXIETY: ICD-10-CM

## 2021-09-07 DIAGNOSIS — M54.17 LUMBOSACRAL NEURITIS: ICD-10-CM

## 2021-09-07 DIAGNOSIS — G89.29 CHRONIC MIDLINE LOW BACK PAIN WITHOUT SCIATICA: ICD-10-CM

## 2021-09-07 RX ORDER — OXYCODONE HYDROCHLORIDE AND ACETAMINOPHEN 5; 325 MG/1; MG/1
1 TABLET ORAL EVERY 8 HOURS PRN
Qty: 90 TABLET | Refills: 0 | Status: SHIPPED | OUTPATIENT
Start: 2021-09-07 | End: 2021-10-07

## 2021-09-07 RX ORDER — LORAZEPAM 0.5 MG/1
TABLET ORAL
Qty: 60 TABLET | Refills: 0 | Status: SHIPPED | OUTPATIENT
Start: 2021-09-07 | End: 2021-10-07

## 2021-09-07 NOTE — TELEPHONE ENCOUNTER
----- Message from Aletha Leyden sent at 9/7/2021  9:01 AM EDT -----  Subject: Refill Request    QUESTIONS  Name of Medication? LORazepam (ATIVAN) 0.5 MG tablet  Patient-reported dosage and instructions? 3x daily , as needed  How many days do you have left? 3  Preferred Pharmacy? [address] phone number (if available)? [phone]  Additional Information for Provider? 30 day supply  ---------------------------------------------------------------------------  --------------,  Name of Medication? oxyCODONE-acetaminophen (PERCOCET) 5-325 MG per tablet  Patient-reported dosage and instructions? 3x daily  How many days do you have left? 5  Preferred Pharmacy? [address] phone number (if available)? [phone]  Additional Information for Provider? 30 day supply  ---------------------------------------------------------------------------  --------------  CALL BACK INFO  What is the best way for the office to contact you? OK to leave message on   voicemail  Preferred Call Back Phone Number?  [phone]

## 2021-09-07 NOTE — TELEPHONE ENCOUNTER
Health Maintenance   Topic Date Due    COVID-19 Vaccine (1) Never done    Colon cancer screen colonoscopy  Never done    Breast cancer screen  Never done    Shingles Vaccine (1 of 2) Never done    DEXA (modify frequency per FRAX score)  Never done    Pneumococcal 65+ years Vaccine (1 of 1 - PPSV23) Never done    Flu vaccine (1) Never done    Potassium monitoring  04/05/2022    Creatinine monitoring  04/05/2022    DTaP/Tdap/Td vaccine (2 - Td or Tdap) 05/08/2023    Lipid screen  04/05/2026    Hepatitis C screen  Completed    Hepatitis A vaccine  Aged Out    Hepatitis B vaccine  Aged Out    Hib vaccine  Aged Out    Meningococcal (ACWY) vaccine  Aged Out             (applicable per patient's age: Cancer Screenings, Depression Screening, Fall Risk Screening, Immunizations)    LDL Cholesterol (mg/dL)   Date Value   04/05/2021 137 (H)     AST (U/L)   Date Value   04/05/2021 22     ALT (U/L)   Date Value   04/05/2021 18     BUN (mg/dL)   Date Value   04/05/2021 19      (goal A1C is < 7)   (goal LDL is <100) need 30-50% reduction from baseline     BP Readings from Last 3 Encounters:   07/08/21 138/86   04/05/21 (!) 148/99   12/04/19 138/80    (goal /80)      All Future Testing planned in [facility]:  Lab Frequency Next Occurrence       Next Visit Date:  Future Appointments   Date Time Provider Yonatan Moreno   [facility]2021 10[facility]            Patient Active Problem List:     Foreign body of thumb, left, infected     Lumbosacral neuritis     Allergic rhinitis     H/O total hysterectomy     Hypertension     Anxiety     Neuropathy     Mixed hyperlipidemia

## 2021-09-20 DIAGNOSIS — I10 ESSENTIAL HYPERTENSION: ICD-10-CM

## 2021-09-20 RX ORDER — HYDROCHLOROTHIAZIDE 12.5 MG/1
TABLET ORAL
Qty: 60 TABLET | Refills: 5 | Status: SHIPPED | OUTPATIENT
Start: 2021-09-20 | End: 2022-08-16

## 2021-10-03 DIAGNOSIS — F32.9 REACTIVE DEPRESSION: ICD-10-CM

## 2021-10-03 RX ORDER — CITALOPRAM 20 MG/1
TABLET ORAL
Qty: 30 TABLET | Refills: 5 | Status: SHIPPED | OUTPATIENT
Start: 2021-10-03 | End: 2022-03-29

## 2021-10-08 ENCOUNTER — OFFICE VISIT (OUTPATIENT)
Dept: FAMILY MEDICINE CLINIC | Age: 66
End: 2021-10-08
Payer: COMMERCIAL

## 2021-10-08 VITALS
WEIGHT: 173.8 LBS | TEMPERATURE: 97 F | SYSTOLIC BLOOD PRESSURE: 134 MMHG | HEART RATE: 85 BPM | OXYGEN SATURATION: 97 % | DIASTOLIC BLOOD PRESSURE: 82 MMHG | BODY MASS INDEX: 28.05 KG/M2

## 2021-10-08 DIAGNOSIS — I10 PRIMARY HYPERTENSION: ICD-10-CM

## 2021-10-08 DIAGNOSIS — G89.29 CHRONIC MIDLINE LOW BACK PAIN WITHOUT SCIATICA: Primary | ICD-10-CM

## 2021-10-08 DIAGNOSIS — E78.2 MIXED HYPERLIPIDEMIA: ICD-10-CM

## 2021-10-08 DIAGNOSIS — F41.9 ANXIETY: ICD-10-CM

## 2021-10-08 DIAGNOSIS — M54.50 CHRONIC MIDLINE LOW BACK PAIN WITHOUT SCIATICA: Primary | ICD-10-CM

## 2021-10-08 PROCEDURE — 99213 OFFICE O/P EST LOW 20 MIN: CPT | Performed by: FAMILY MEDICINE

## 2021-10-08 RX ORDER — OXYCODONE HYDROCHLORIDE AND ACETAMINOPHEN 5; 325 MG/1; MG/1
1 TABLET ORAL EVERY 4 HOURS PRN
Qty: 90 TABLET | Refills: 0 | Status: SHIPPED | OUTPATIENT
Start: 2021-10-08 | End: 2021-11-05 | Stop reason: SDUPTHER

## 2021-10-08 RX ORDER — LORAZEPAM 0.5 MG/1
0.5 TABLET ORAL EVERY 6 HOURS PRN
COMMUNITY
End: 2021-10-08 | Stop reason: SDUPTHER

## 2021-10-08 RX ORDER — OXYCODONE HYDROCHLORIDE AND ACETAMINOPHEN 5; 325 MG/1; MG/1
1 TABLET ORAL EVERY 4 HOURS PRN
COMMUNITY
End: 2021-10-08 | Stop reason: SDUPTHER

## 2021-10-08 RX ORDER — LORAZEPAM 0.5 MG/1
0.5 TABLET ORAL EVERY 6 HOURS PRN
Qty: 90 TABLET | Refills: 0 | Status: SHIPPED | OUTPATIENT
Start: 2021-10-08 | End: 2021-11-05

## 2021-10-08 ASSESSMENT — ENCOUNTER SYMPTOMS
EYES NEGATIVE: 1
DIARRHEA: 0
BACK PAIN: 1
ALLERGIC/IMMUNOLOGIC NEGATIVE: 1
CONSTIPATION: 0
ABDOMINAL PAIN: 0
COUGH: 0
SHORTNESS OF BREATH: 0

## 2021-10-08 ASSESSMENT — PATIENT HEALTH QUESTIONNAIRE - PHQ9
SUM OF ALL RESPONSES TO PHQ QUESTIONS 1-9: 2
SUM OF ALL RESPONSES TO PHQ9 QUESTIONS 1 & 2: 2
1. LITTLE INTEREST OR PLEASURE IN DOING THINGS: 1
2. FEELING DOWN, DEPRESSED OR HOPELESS: 1
SUM OF ALL RESPONSES TO PHQ QUESTIONS 1-9: 2
SUM OF ALL RESPONSES TO PHQ QUESTIONS 1-9: 2

## 2021-10-08 NOTE — PROGRESS NOTES
[facility]  [facility]  [address]  [facility] [address]  Dept: [phone]    10/8/2021    CHIEF COMPLAINT    Chief Complaint   Patient presents with    3 Month Follow-Up    Anxiety    Back Pain     Chronic       HPI    Heather Malcolm is a 74 y.o. female who presents   Chief Complaint   Patient presents with    3 Month Follow-Up    Anxiety    Back Pain     Chronic   . Recheck chronic conditions including anxiety, chronic back pain. Taking medications as prescribed with no side effects and good effectiveness. Staying active within limitations. Vitals:    10/08/21 1039 10/08/21 1103   BP: (!) 140/80 134/82   Site: Left Upper Arm Left Upper Arm   Position: Sitting Sitting   Cuff Size: Medium Adult Large Adult   Pulse: 83 85   Temp: 97 °F (36.1 °C)    TempSrc: Temporal    SpO2: 95% 97%   Weight: 173 lb 12.8 oz (78.8 kg)        REVIEW OF SYSTEMS    Review of Systems   Constitutional: Negative for fatigue, fever and unexpected weight change. HENT: Negative. Eyes: Negative. Respiratory: Negative for cough and shortness of breath. Cardiovascular: Negative for chest pain and leg swelling. Gastrointestinal: Negative for abdominal pain, constipation and diarrhea. Endocrine: Negative. Genitourinary: Negative for frequency and urgency. Musculoskeletal: Positive for back pain. Skin: Negative. Allergic/Immunologic: Negative. Neurological: Negative for dizziness and headaches. Hematological: Negative. Psychiatric/Behavioral: Negative for sleep disturbance. The patient is nervous/anxious. PAST MEDICAL HISTORY    Past Medical History:   Diagnosis Date    Allergic rhinitis     Anxiety     Back pain, chronic     Broken teeth     broken front teeth from intubation during general anesthesia [facility])    Foreign body of thumb, left, infected 05/05/2013    Wooden sliver of left thumb (5/5/2013).   Treated with antibiotics, responding well.  Hepatitis A infection 1972 or 1973    history of sarthak this in college (from lab rats)    Hypertension     Mixed hyperlipidemia     Neuropathy     feet    Osteoarthritis     BACK    Post-operative nausea and vomiting     Status post dental restoration     front teeth replaced by dental implants (posts)    Wears contact lenses        FAMILY HISTORY    Family History   Problem Relation Age of Onset    High Blood Pressure Father     High Blood Pressure Sister        SOCIAL HISTORY    Social History     Socioeconomic History    Marital status:      Spouse name: Not on file    Number of children: 3    Years of education: Not on file    Highest education level: Not on file   Occupational History    Not on file   Tobacco Use    Smoking status: Never Smoker    Smokeless tobacco: Never Used   Substance and Sexual Activity    Alcohol use: No     Comment: States that she used to drink socially 20 years ago, but gave it up.  Drug use: No    Sexual activity: Not Currently   Other Topics Concern    Not on file   Social History Narrative    Not on file     Social Determinants of Health     Financial Resource Strain: Low Risk     Difficulty of Paying Living Expenses: Not hard at all   Food Insecurity: No Food Insecurity    Worried About Running Out of Food in the Last Year: Never true    Mariela of Food in the Last Year: Never true   Transportation Needs: No Transportation Needs    Lack of Transportation (Medical): No    Lack of Transportation (Non-Medical):  No   Physical Activity:     Days of Exercise per Week:     Minutes of Exercise per Session:    Stress:     Feeling of Stress :    Social Connections:     Frequency of Communication with Friends and Family:     Frequency of Social Gatherings with Friends and Family:     Attends Taoist Services:     Active Member of Clubs or Organizations:     Attends Club or Organization Meetings:     Marital Status:    Intimate Partner Violence:     Fear of Current or Ex-Partner:     Emotionally Abused:     Physically Abused:     Sexually Abused:        SURGICAL HISTORY    Past Surgical History:   Procedure Laterality Date    APPENDECTOMY  2003    FOREIGN BODY REMOVAL Right 5/17/2013    hand    HYSTERECTOMY  2003    LUMBAR DISC SURGERY      x 3    NERVE BLOCK  11/19/14    duramorph, decadron 10mg    TONSILLECTOMY      TUBAL LIGATION         CURRENT MEDICATIONS    Current Outpatient Medications   Medication Sig Dispense Refill    LORazepam (ATIVAN) 0.5 MG tablet Take 1 tablet by mouth every 6 hours as needed for Anxiety for up to 30 days. 90 tablet 0    oxyCODONE-acetaminophen (PERCOCET) 5-325 MG per tablet Take 1 tablet by mouth every 4 hours as needed for Pain for up to 30 days. 90 tablet 0    citalopram (CELEXA) 20 MG tablet TAKE ONE TABLET BY MOUTH DAILY 30 tablet 5    hydroCHLOROthiazide (HYDRODIURIL) 12.5 MG tablet TAKE ONE TABLET BY MOUTH DAILY 60 tablet 5     No current facility-administered medications for this visit. ALLERGIES    Allergies   Allergen Reactions    Bactrim [Sulfamethoxazole-Trimethoprim] Swelling     Facial swelling    Lisinopril Other (See Comments)     Cough, keeping her awake at night       PHYSICAL EXAM   Physical Exam  Vitals reviewed. Constitutional:       Appearance: She is well-developed. HENT:      Head: Normocephalic. Eyes:      Conjunctiva/sclera: Conjunctivae normal.      Pupils: Pupils are equal, round, and reactive to light. Neck:      Thyroid: No thyromegaly. Cardiovascular:      Rate and Rhythm: Normal rate and regular rhythm. Heart sounds: Normal heart sounds. No murmur heard. Pulmonary:      Effort: Pulmonary effort is normal.      Breath sounds: Normal breath sounds. No wheezing or rales. Abdominal:      Palpations: Abdomen is soft. Tenderness: There is no abdominal tenderness. There is no guarding or rebound.    Musculoskeletal:

## 2021-11-05 DIAGNOSIS — M54.50 CHRONIC MIDLINE LOW BACK PAIN WITHOUT SCIATICA: ICD-10-CM

## 2021-11-05 DIAGNOSIS — G89.29 CHRONIC MIDLINE LOW BACK PAIN WITHOUT SCIATICA: ICD-10-CM

## 2021-11-05 DIAGNOSIS — F41.9 ANXIETY: ICD-10-CM

## 2021-11-05 RX ORDER — LORAZEPAM 0.5 MG/1
TABLET ORAL
Qty: 90 TABLET | Refills: 0 | Status: SHIPPED | OUTPATIENT
Start: 2021-11-05 | End: 2021-12-05

## 2021-11-05 RX ORDER — OXYCODONE HYDROCHLORIDE AND ACETAMINOPHEN 5; 325 MG/1; MG/1
1 TABLET ORAL EVERY 4 HOURS PRN
Qty: 90 TABLET | Refills: 0 | Status: SHIPPED | OUTPATIENT
Start: 2021-11-05 | End: 2021-12-07 | Stop reason: SDUPTHER

## 2021-12-07 DIAGNOSIS — G89.29 CHRONIC MIDLINE LOW BACK PAIN WITHOUT SCIATICA: ICD-10-CM

## 2021-12-07 DIAGNOSIS — M54.50 CHRONIC MIDLINE LOW BACK PAIN WITHOUT SCIATICA: ICD-10-CM

## 2021-12-07 DIAGNOSIS — F41.9 ANXIETY: Primary | ICD-10-CM

## 2021-12-07 RX ORDER — LORAZEPAM 0.5 MG/1
TABLET ORAL
Qty: 90 TABLET | Refills: 0 | Status: SHIPPED | OUTPATIENT
Start: 2021-12-07 | End: 2022-01-06 | Stop reason: SDUPTHER

## 2021-12-07 RX ORDER — OXYCODONE HYDROCHLORIDE AND ACETAMINOPHEN 5; 325 MG/1; MG/1
1 TABLET ORAL EVERY 4 HOURS PRN
Qty: 90 TABLET | Refills: 0 | Status: SHIPPED | OUTPATIENT
Start: 2021-12-07 | End: 2022-01-06 | Stop reason: SDUPTHER

## 2021-12-07 NOTE — TELEPHONE ENCOUNTER
Pharm requested    Health Maintenance   Topic Date Due    Colon cancer screen colonoscopy  Never done    Breast cancer screen  Never done    Shingles Vaccine (1 of 2) Never done    DEXA (modify frequency per FRAX score)  Never done    Pneumococcal 65+ years Vaccine (1 of 1 - PPSV23) Never done    Flu vaccine (1) Never done    COVID-19 Vaccine (3 - Booster for Pfizer series) 03/07/2022    Potassium monitoring  04/05/2022    Creatinine monitoring  04/05/2022    DTaP/Tdap/Td vaccine (2 - Td or Tdap) 05/08/2023    Lipid screen  04/05/2026    Hepatitis C screen  Completed    Hepatitis A vaccine  Aged Out    Hepatitis B vaccine  Aged Out    Hib vaccine  Aged Out    Meningococcal (ACWY) vaccine  Aged Out             (applicable per patient's age: Cancer Screenings, Depression Screening, Fall Risk Screening, Immunizations)    LDL Cholesterol (mg/dL)   Date Value   04/05/2021 137 (H)     AST (U/L)   Date Value   04/05/2021 22     ALT (U/L)   Date Value   04/05/2021 18     BUN (mg/dL)   Date Value   04/05/2021 19      (goal A1C is < 7)   (goal LDL is <100) need 30-50% reduction from baseline     BP Readings from Last 3 Encounters:   10/08/21 134/82   07/08/21 138/86   04/05/21 (!) 148/99    (goal /80)      All Future Testing planned in [facility]:  Lab Frequency Next Occurrence       Next Visit Date:  Future Appointments   Date Time Provider Yonatan Moreno   1/10/2022  9:30 AM Jayde Patel MD [address]            Patient Active Problem List:     Foreign body of thumb, left, infected     Lumbosacral neuritis     Allergic rhinitis     H/O total hysterectomy     Hypertension     Anxiety     Neuropathy     Mixed hyperlipidemia

## 2022-01-10 ENCOUNTER — OFFICE VISIT (OUTPATIENT)
Dept: FAMILY MEDICINE CLINIC | Age: 67
End: 2022-01-10
Payer: COMMERCIAL

## 2022-01-10 VITALS
BODY MASS INDEX: 27.54 KG/M2 | SYSTOLIC BLOOD PRESSURE: 120 MMHG | DIASTOLIC BLOOD PRESSURE: 70 MMHG | WEIGHT: 170.6 LBS | HEART RATE: 72 BPM | OXYGEN SATURATION: 98 %

## 2022-01-10 DIAGNOSIS — M54.17 LUMBOSACRAL NEURITIS: Primary | ICD-10-CM

## 2022-01-10 DIAGNOSIS — K04.7 CHRONIC DENTAL INFECTION: ICD-10-CM

## 2022-01-10 DIAGNOSIS — I10 PRIMARY HYPERTENSION: ICD-10-CM

## 2022-01-10 DIAGNOSIS — F41.9 ANXIETY: ICD-10-CM

## 2022-01-10 PROCEDURE — 99214 OFFICE O/P EST MOD 30 MIN: CPT | Performed by: FAMILY MEDICINE

## 2022-01-10 RX ORDER — DOXYCYCLINE HYCLATE 100 MG
100 TABLET ORAL 2 TIMES DAILY
Qty: 60 TABLET | Refills: 0 | Status: SHIPPED | OUTPATIENT
Start: 2022-01-10 | End: 2022-10-10 | Stop reason: SDUPTHER

## 2022-01-10 ASSESSMENT — ENCOUNTER SYMPTOMS
DIARRHEA: 0
CONSTIPATION: 0
COUGH: 0
BLOOD IN STOOL: 0
ALLERGIC/IMMUNOLOGIC NEGATIVE: 1
ABDOMINAL PAIN: 0
BACK PAIN: 1
SHORTNESS OF BREATH: 0
EYES NEGATIVE: 1

## 2022-01-10 NOTE — PROGRESS NOTES
[address] [place] [address] [facility] [address]  Dept: [phone]    1/10/2022    CHIEF COMPLAINT    Chief Complaint   Patient presents with    Chronic Pain       HPI    Sita Yang is a 74 y.o. female who presents   Chief Complaint   Patient presents with    Chronic Pain   . Recheck chronic conditions including back pain, anxiety      Vitals:    01/10/22 0925   BP: 120/70   Pulse: 72   SpO2: 98%   Weight: 170 lb 9.6 oz (77.4 kg)       REVIEW OF SYSTEMS    Review of Systems   Constitutional: Negative for fatigue, fever and unexpected weight change. HENT: Positive for dental problem. Congestion: has dental infections. Eyes: Negative. Respiratory: Negative for cough and shortness of breath. Cardiovascular: Negative for chest pain and leg swelling. Gastrointestinal: Negative for abdominal pain, blood in stool, constipation and diarrhea. Endocrine: Negative. Genitourinary: Negative for frequency and urgency. Musculoskeletal: Positive for back pain. Skin: Negative. Allergic/Immunologic: Negative. Neurological: Negative for dizziness and headaches. Hematological: Negative. Psychiatric/Behavioral: Negative for sleep disturbance. The patient is nervous/anxious. PAST MEDICAL HISTORY    Past Medical History:   Diagnosis Date    Allergic rhinitis     Anxiety     Back pain, chronic     Broken teeth     broken front teeth from intubation during general anesthesia [facility])    Foreign body of thumb, left, infected 05/05/2013    Wooden sliver of left thumb (5/5/2013). Treated with antibiotics, responding well.      Hepatitis A infection 1972 or 1973    history of sarthak this in college (from lab rats)    Hypertension     Mixed hyperlipidemia     Neuropathy     feet    Osteoarthritis     BACK    Post-operative nausea and vomiting     Status post dental restoration     front teeth replaced by dental implants (posts)    Wears contact lenses        FAMILY HISTORY    Family History   Problem Relation Age of Onset    High Blood Pressure Father     High Blood Pressure Sister        SOCIAL HISTORY    Social History     Socioeconomic History    Marital status:      Spouse name: None    Number of children: 3    Years of education: None    Highest education level: None   Occupational History    None   Tobacco Use    Smoking status: Never Smoker    Smokeless tobacco: Never Used   Substance and Sexual Activity    Alcohol use: No     Comment: States that she used to drink socially 20 years ago, but gave it up.  Drug use: No    Sexual activity: Not Currently   Other Topics Concern    None   Social History Narrative    None     Social Determinants of Health     Financial Resource Strain: Low Risk     Difficulty of Paying Living Expenses: Not hard at all   Food Insecurity: No Food Insecurity    Worried About Running Out of Food in the Last Year: Never true    Mariela of Food in the Last Year: Never true   Transportation Needs: No Transportation Needs    Lack of Transportation (Medical): No    Lack of Transportation (Non-Medical):  No   Physical Activity:     Days of Exercise per Week: Not on file    Minutes of Exercise per Session: Not on file   Stress:     Feeling of Stress : Not on file   Social Connections:     Frequency of Communication with Friends and Family: Not on file    Frequency of Social Gatherings with Friends and Family: Not on file    Attends Holiness Services: Not on file    Active Member of Clubs or Organizations: Not on file    Attends Club or Organization Meetings: Not on file    Marital Status: Not on file   Intimate Partner Violence:     Fear of Current or Ex-Partner: Not on file    Emotionally Abused: Not on file    Physically Abused: Not on file    Sexually Abused: Not on file   Housing Stability:     Unable to Pay for Housing in the Last Year: Not on file    Number of Places Lived in the Last Year: Not on file    Unstable Housing in the Last Year: Not on file       SURGICAL HISTORY    Past Surgical History:   Procedure Laterality Date    APPENDECTOMY  2003    FOREIGN BODY REMOVAL Right 5/17/2013    hand    HYSTERECTOMY  2003    LUMBAR DISC SURGERY      x 3    NERVE BLOCK  11/19/14    duramorph, decadron 10mg    TONSILLECTOMY      TUBAL LIGATION         CURRENT MEDICATIONS    Current Outpatient Medications   Medication Sig Dispense Refill    doxycycline hyclate (VIBRA-TABS) 100 MG tablet Take 1 tablet by mouth 2 times daily 60 tablet 0    LORazepam (ATIVAN) 0.5 MG tablet Take 1 tablet by mouth every 8 hours as needed for Anxiety for up to 30 days. 90 tablet 0    oxyCODONE-acetaminophen (PERCOCET) 5-325 MG per tablet Take 1 tablet by mouth every 4 hours as needed for Pain for up to 30 days. 90 tablet 0    citalopram (CELEXA) 20 MG tablet TAKE ONE TABLET BY MOUTH DAILY 30 tablet 5    hydroCHLOROthiazide (HYDRODIURIL) 12.5 MG tablet TAKE ONE TABLET BY MOUTH DAILY 60 tablet 5     No current facility-administered medications for this visit. ALLERGIES    Allergies   Allergen Reactions    Bactrim [Sulfamethoxazole-Trimethoprim] Swelling     Facial swelling    Lisinopril Other (See Comments)     Cough, keeping her awake at night       PHYSICAL EXAM   Physical Exam  Vitals reviewed. Constitutional:       Appearance: She is well-developed. HENT:      Head: Normocephalic. Eyes:      Conjunctiva/sclera: Conjunctivae normal.      Pupils: Pupils are equal, round, and reactive to light. Neck:      Thyroid: No thyromegaly. Cardiovascular:      Rate and Rhythm: Normal rate and regular rhythm. Heart sounds: Normal heart sounds. No murmur heard. Pulmonary:      Effort: Pulmonary effort is normal.      Breath sounds: Normal breath sounds. No wheezing or rales. Abdominal:      Palpations: Abdomen is soft. Tenderness:  There is no abdominal tenderness. There is no guarding or rebound. Musculoskeletal:         General: Tenderness (low back) present. No deformity. Normal range of motion. Cervical back: Normal range of motion and neck supple. Lymphadenopathy:      Cervical: No cervical adenopathy. Skin:     General: Skin is warm and dry. Neurological:      Mental Status: She is alert and oriented to person, place, and time. Psychiatric:         Mood and Affect: Mood normal.         Behavior: Behavior normal.         Thought Content: Thought content normal.         Judgment: Judgment normal.         ASSESSMENT/PLAN  1. Lumbosacral neuritis  Chronic, controlled with medication. 2. Primary hypertension  Chronic, stable, continue current medication and/or plan      3. Anxiety  Cont med as needed    4. Chronic dental infection  Take for at least a week, then can go to once daily. See dentist when ready.   - doxycycline hyclate (VIBRA-TABS) 100 MG tablet; Take 1 tablet by mouth 2 times daily  Dispense: 60 tablet; Refill: 0       Donna received counseling on the following healthy behaviors: nutrition, exercise and medication adherence  Reviewed prior labs and health maintenance. Continue current medications, diet and exercise. Discussed use, benefit, and side effects of prescribed medications. Barriers to medication compliance addressed. Patient given educational materials - see patient instructions. All patient questions answered. Patient voiced understanding. Return in about 3 months (around 4/10/2022) for chronic pain.         Electronically signed by Kylee Xiao MD on 1/10/22 at 9:40 AM EST

## 2022-02-04 DIAGNOSIS — F41.9 ANXIETY: ICD-10-CM

## 2022-02-04 RX ORDER — LORAZEPAM 0.5 MG/1
TABLET ORAL
Qty: 90 TABLET | Refills: 0 | Status: SHIPPED | OUTPATIENT
Start: 2022-02-04 | End: 2022-03-04

## 2022-02-07 DIAGNOSIS — M54.50 CHRONIC MIDLINE LOW BACK PAIN WITHOUT SCIATICA: ICD-10-CM

## 2022-02-07 DIAGNOSIS — G89.29 CHRONIC MIDLINE LOW BACK PAIN WITHOUT SCIATICA: ICD-10-CM

## 2022-02-07 RX ORDER — OXYCODONE HYDROCHLORIDE AND ACETAMINOPHEN 5; 325 MG/1; MG/1
1 TABLET ORAL EVERY 4 HOURS PRN
Qty: 90 TABLET | Refills: 0 | Status: SHIPPED | OUTPATIENT
Start: 2022-02-07 | End: 2022-03-04 | Stop reason: SDUPTHER

## 2022-02-07 NOTE — TELEPHONE ENCOUNTER
Elma Beasley is calling to request a refill on the following medication(s):    Last Visit Date (If Applicable):  1/67/7943    Next Visit Date:    4/11/2022    Medication Request:  Requested Prescriptions     Pending Prescriptions Disp Refills    oxyCODONE-acetaminophen (PERCOCET) 5-325 MG per tablet 90 tablet 0     Sig: Take 1 tablet by mouth every 4 hours as needed for Pain for up to 30 days.

## 2022-03-04 DIAGNOSIS — F41.9 ANXIETY: ICD-10-CM

## 2022-03-04 DIAGNOSIS — G89.29 CHRONIC MIDLINE LOW BACK PAIN WITHOUT SCIATICA: ICD-10-CM

## 2022-03-04 DIAGNOSIS — M54.50 CHRONIC MIDLINE LOW BACK PAIN WITHOUT SCIATICA: ICD-10-CM

## 2022-03-04 RX ORDER — OXYCODONE HYDROCHLORIDE AND ACETAMINOPHEN 5; 325 MG/1; MG/1
1 TABLET ORAL EVERY 4 HOURS PRN
Qty: 90 TABLET | Refills: 0 | Status: SHIPPED | OUTPATIENT
Start: 2022-03-04 | End: 2022-04-07 | Stop reason: SDUPTHER

## 2022-03-04 RX ORDER — LORAZEPAM 0.5 MG/1
TABLET ORAL
Qty: 90 TABLET | Refills: 0 | Status: SHIPPED | OUTPATIENT
Start: 2022-03-04 | End: 2022-04-03

## 2022-03-04 NOTE — TELEPHONE ENCOUNTER
Pamela Henderson is calling to request a refill on the following medication(s):    Last Visit Date (If Applicable):  6/35/6452    Next Visit Date:    4/11/2022    Medication Request:  Requested Prescriptions     Pending Prescriptions Disp Refills    LORazepam (ATIVAN) 0.5 MG tablet [Pharmacy Med Name: LORAZEPAM 0.5 MG TABLET] 90 tablet      Sig: TAKE ONE TABLET BY MOUTH EVERY 8 HOURS AS NEEDED FOR ANXIETY

## 2022-03-04 NOTE — TELEPHONE ENCOUNTER
Robby Clark is calling to request a refill on the following medication(s):    Last Visit Date (If Applicable):  3/99/6735    Next Visit Date:    4/11/2022    Medication Request:  Requested Prescriptions     Pending Prescriptions Disp Refills    LORazepam (ATIVAN) 0.5 MG tablet [Pharmacy Med Name: LORAZEPAM 0.5 MG TABLET] 90 tablet      Sig: TAKE ONE TABLET BY MOUTH EVERY 8 HOURS AS NEEDED FOR ANXIETY    oxyCODONE-acetaminophen (PERCOCET) 5-325 MG per tablet 90 tablet 0     Sig: Take 1 tablet by mouth every 4 hours as needed for Pain for up to 30 days.

## 2022-03-29 DIAGNOSIS — F32.9 REACTIVE DEPRESSION: ICD-10-CM

## 2022-03-29 RX ORDER — CITALOPRAM 20 MG/1
TABLET ORAL
Qty: 30 TABLET | Refills: 5 | Status: SHIPPED | OUTPATIENT
Start: 2022-03-29 | End: 2022-09-26

## 2022-03-29 NOTE — TELEPHONE ENCOUNTER
[facility] is calling to request a refill on the following medication(s):    Last Visit Date (If Applicable):  6/94/8212    Next Visit Date:    4/11/2022    Medication Request:  Requested Prescriptions     Pending Prescriptions Disp Refills    citalopram (CELEXA) 20 MG tablet [Pharmacy Med Name: CITALOPRAM HBR 20 MG TABLET] 30 tablet 5     Sig: TAKE ONE TABLET BY MOUTH DAILY

## 2022-04-07 DIAGNOSIS — F41.9 ANXIETY: Primary | ICD-10-CM

## 2022-04-07 DIAGNOSIS — G89.29 CHRONIC MIDLINE LOW BACK PAIN WITHOUT SCIATICA: ICD-10-CM

## 2022-04-07 DIAGNOSIS — M54.50 CHRONIC MIDLINE LOW BACK PAIN WITHOUT SCIATICA: ICD-10-CM

## 2022-04-07 RX ORDER — OXYCODONE HYDROCHLORIDE AND ACETAMINOPHEN 5; 325 MG/1; MG/1
1 TABLET ORAL EVERY 4 HOURS PRN
Qty: 90 TABLET | Refills: 0 | Status: SHIPPED | OUTPATIENT
Start: 2022-04-07 | End: 2022-05-06 | Stop reason: SDUPTHER

## 2022-04-07 RX ORDER — LORAZEPAM 0.5 MG/1
TABLET ORAL
Qty: 90 TABLET | Refills: 0 | Status: SHIPPED | OUTPATIENT
Start: 2022-04-07 | End: 2022-05-06

## 2022-04-11 ENCOUNTER — OFFICE VISIT (OUTPATIENT)
Dept: FAMILY MEDICINE CLINIC | Age: 67
End: 2022-04-11
Payer: COMMERCIAL

## 2022-04-11 VITALS
SYSTOLIC BLOOD PRESSURE: 130 MMHG | OXYGEN SATURATION: 97 % | DIASTOLIC BLOOD PRESSURE: 80 MMHG | HEART RATE: 82 BPM | BODY MASS INDEX: 27.57 KG/M2 | WEIGHT: 170.8 LBS

## 2022-04-11 DIAGNOSIS — G89.29 CHRONIC MIDLINE LOW BACK PAIN WITHOUT SCIATICA: Primary | ICD-10-CM

## 2022-04-11 DIAGNOSIS — I10 PRIMARY HYPERTENSION: ICD-10-CM

## 2022-04-11 DIAGNOSIS — M54.50 CHRONIC MIDLINE LOW BACK PAIN WITHOUT SCIATICA: Primary | ICD-10-CM

## 2022-04-11 DIAGNOSIS — Z12.31 ENCOUNTER FOR SCREENING MAMMOGRAM FOR MALIGNANT NEOPLASM OF BREAST: ICD-10-CM

## 2022-04-11 DIAGNOSIS — E78.2 MIXED HYPERLIPIDEMIA: ICD-10-CM

## 2022-04-11 DIAGNOSIS — E55.9 VITAMIN D DEFICIENCY: ICD-10-CM

## 2022-04-11 PROCEDURE — 99214 OFFICE O/P EST MOD 30 MIN: CPT | Performed by: FAMILY MEDICINE

## 2022-04-11 ASSESSMENT — ENCOUNTER SYMPTOMS
ALLERGIC/IMMUNOLOGIC NEGATIVE: 1
COUGH: 0
BLOOD IN STOOL: 0
DIARRHEA: 0
BACK PAIN: 1
EYES NEGATIVE: 1
CONSTIPATION: 0
SHORTNESS OF BREATH: 0
ABDOMINAL PAIN: 0

## 2022-04-11 NOTE — PROGRESS NOTES
[facility]  [address] [facility]  [place] [address] [facility] [address]  Dept: [phone]    4/11/2022    CHIEF COMPLAINT    Chief Complaint   Patient presents with    Chronic Pain    Anxiety    Hypertension       HPI    David Islas is a 74 y.o. female who presents   Chief Complaint   Patient presents with    Chronic Pain    Anxiety    Hypertension   . Recheck chronic condtions including hypertension, anxiety and chronic back pain. . Taking medications as prescribed for htn, chronic back pain, depression and anxiety with no side effects and good effectiveness. She is able to live independently and do all activities of daily living. She has 8 grandchildren whom she sees regularly. Vitals:    04/11/22 0942 04/11/22 1007   BP: (!) 140/86 130/80   Pulse: 82    SpO2: 97%    Weight: 170 lb 12.8 oz (77.5 kg)        REVIEW OF SYSTEMS    Review of Systems   Constitutional: Negative for fatigue, fever and unexpected weight change. HENT: Negative. Eyes: Negative. Respiratory: Negative for cough and shortness of breath. Cardiovascular: Negative for chest pain and leg swelling. Gastrointestinal: Negative for abdominal pain, blood in stool, constipation and diarrhea. Endocrine: Negative. Genitourinary: Negative for frequency and urgency. Musculoskeletal: Positive for back pain (controlled on pain med). Skin: Negative. Allergic/Immunologic: Negative. Neurological: Negative for dizziness and headaches. Hematological: Negative. Psychiatric/Behavioral: Negative for sleep disturbance. The patient is nervous/anxious (stable on med).         PAST MEDICAL HISTORY    Past Medical History:   Diagnosis Date    Allergic rhinitis     Anxiety     Back pain, chronic     Broken teeth     broken front teeth from intubation during general anesthesia [facility])    Foreign body of thumb, left, infected 05/05/2013    Wooden sliver of left thumb (5/5/2013). Treated with antibiotics, responding well.  Hepatitis A infection 1972 or 1973    history of sarthak this in college (from lab rats)    Hypertension     Mixed hyperlipidemia     Neuropathy     feet    Osteoarthritis     BACK    Post-operative nausea and vomiting     Status post dental restoration     front teeth replaced by dental implants (posts)    Wears contact lenses        FAMILY HISTORY    Family History   Problem Relation Age of Onset    High Blood Pressure Father     High Blood Pressure Sister        SOCIAL HISTORY    Social History     Socioeconomic History    Marital status:      Spouse name: None    Number of children: 3    Years of education: None    Highest education level: None   Occupational History    None   Tobacco Use    Smoking status: Never Smoker    Smokeless tobacco: Never Used   Substance and Sexual Activity    Alcohol use: No     Comment: States that she used to drink socially 20 years ago, but gave it up.  Drug use: No    Sexual activity: Not Currently   Other Topics Concern    None   Social History Narrative    None     Social Determinants of Health     Financial Resource Strain: Low Risk     Difficulty of Paying Living Expenses: Not hard at all   Food Insecurity: No Food Insecurity    Worried About Running Out of Food in the Last Year: Never true    Mariela of Food in the Last Year: Never true   Transportation Needs: No Transportation Needs    Lack of Transportation (Medical): No    Lack of Transportation (Non-Medical):  No   Physical Activity:     Days of Exercise per Week: Not on file    Minutes of Exercise per Session: Not on file   Stress:     Feeling of Stress : Not on file   Social Connections:     Frequency of Communication with Friends and Family: Not on file    Frequency of Social Gatherings with Friends and Family: Not on file    Attends Catholic Services: Not on file   [employer] of Clubs or Organizations: Not on file    Attends Club or Organization Meetings: Not on file    Marital Status: Not on file   Intimate Partner Violence:     Fear of Current or Ex-Partner: Not on file    Emotionally Abused: Not on file    Physically Abused: Not on file    Sexually Abused: Not on file   Housing Stability:     Unable to Pay for Housing in the Last Year: Not on file    Number of Cicim[place]ipri[place] in the Last Year: Not on file    Unstable Housing in the Last Year: Not on file       SURGICAL HISTORY    Past Surgical History:   Procedure Laterality Date    APPENDECTOMY  2003   [address] Right 5/17/2013    hand    HYSTERECTOMY  2003    LUMBAR [address]      x 3    NERVE BLOCK  11/19/14    duramorph, decadron 10mg    TONSILLECTOMY      TUBAL LIGATION         CURRENT MEDICATIONS    Current Outpatient Medications   Medication Sig Dispense Refill    oxyCODONE-acetaminophen (PERCOCET) 5-325 MG per tablet Take 1 tablet by mouth every 4 hours as needed for Pain for up to 30 days. 90 tablet 0    LORazepam (ATIVAN) 0.5 MG tablet TAKE ONE TABLET BY MOUTH EVERY 8 HOURS AS NEEDED FOR ANXIETY 90 tablet 0    citalopram (CELEXA) 20 MG tablet TAKE ONE TABLET BY MOUTH DAILY 30 tablet 5    hydroCHLOROthiazide (HYDRODIURIL) 12.5 MG tablet TAKE ONE TABLET BY MOUTH DAILY 60 tablet 5     No current facility-administered medications for this visit. ALLERGIES    Allergies   Allergen Reactions    Bactrim [Sulfamethoxazole-Trimethoprim] Swelling     Facial swelling    Lisinopril Other (See Comments)     Cough, keeping her awake at night       PHYSICAL EXAM   Physical Exam  Vitals reviewed. Constitutional:       Appearance: She is well-developed. HENT:      Head: Normocephalic. Eyes:      Conjunctiva/sclera: Conjunctivae normal.      Pupils: Pupils are equal, round, and reactive to light. Neck:      Thyroid: No thyromegaly. Cardiovascular:      Rate and Rhythm: Normal rate and regular rhythm. Heart sounds: Normal heart sounds. No murmur heard. Pulmonary:      Effort: Pulmonary effort is normal.      Breath sounds: Normal breath sounds. No wheezing or rales. Abdominal:      Palpations: Abdomen is soft. Tenderness: There is no abdominal tenderness. There is no guarding or rebound. Musculoskeletal:         General: Tenderness (low back) present. No deformity. Normal range of motion. Cervical back: Normal range of motion and neck supple. Lymphadenopathy:      Cervical: No cervical adenopathy. Skin:     General: Skin is warm and dry. Neurological:      Mental Status: She is alert and oriented to person, place, and time. Psychiatric:         Mood and Affect: Mood normal.         Behavior: Behavior normal.         Thought Content: Thought content normal.         Judgment: Judgment normal.         ASSESSMENT/PLAN  1. Chronic midline low back pain without sciatica  Chronic, unchanged. Stable on pain medication. Controlled substances monitoring: possible medication side effects, risk of tolerance and/or dependence, and alternative treatments discussed, no signs of potential drug abuse or diversion identified, OARRS report reviewed today- activity consistent with treatment plan and medication contract signed today. 2. Primary hypertension  Chronic, stable, continue current medication and/or plan    - Comprehensive Metabolic Panel; Future    3. Mixed hyperlipidemia  Chronic, stable, continue current medication and/or plan  Not on statin, trying to follow healthy diet  - Lipid Panel; Future    4. Vitamin D deficiency  Adjust if indicated  - Vitamin D 25 Hydroxy; Future    5. Encounter for screening mammogram for malignant neoplasm of breast    - CHANDRA DIGITAL SCREEN W OR WO CAD BILATERAL; Future       Donna received counseling on the following healthy behaviors: nutrition, exercise and medication adherence  Reviewed prior labs and health maintenance.   Continue current medications, diet and exercise. Discussed use, benefit, and side effects of prescribed medications. Barriers to medication compliance addressed. Patient given educational materials - see patient instructions. All patient questions answered. Patient voiced understanding. Return in about 3 months (around 7/11/2022) for chronic pain.         Electronically signed by Luda Edwards MD on 4/11/22 at 9:46 AM EDT

## 2022-05-06 DIAGNOSIS — M54.50 CHRONIC MIDLINE LOW BACK PAIN WITHOUT SCIATICA: ICD-10-CM

## 2022-05-06 DIAGNOSIS — F41.9 ANXIETY: ICD-10-CM

## 2022-05-06 DIAGNOSIS — G89.29 CHRONIC MIDLINE LOW BACK PAIN WITHOUT SCIATICA: ICD-10-CM

## 2022-05-06 RX ORDER — LORAZEPAM 0.5 MG/1
TABLET ORAL
Qty: 90 TABLET | Refills: 0 | Status: SHIPPED | OUTPATIENT
Start: 2022-05-06 | End: 2022-06-05

## 2022-05-06 RX ORDER — OXYCODONE HYDROCHLORIDE AND ACETAMINOPHEN 5; 325 MG/1; MG/1
1 TABLET ORAL EVERY 4 HOURS PRN
Qty: 90 TABLET | Refills: 0 | Status: SHIPPED | OUTPATIENT
Start: 2022-05-06 | End: 2022-06-07 | Stop reason: SDUPTHER

## 2022-05-06 NOTE — TELEPHONE ENCOUNTER
Last visit: 4/11/22  Last Med refill: 4/7/22      Next Visit Date:  Future Appointments   Date Time Provider Yonatan Zhongi   7/11/2022 10:15 AM [facility]   Topic Date Due    Colorectal Cancer Screen  Never done    Breast cancer screen  Never done    Shingles vaccine (1 of 2) Never done    DEXA (modify frequency per FRAX score)  Never done    Pneumococcal 65+ years Vaccine (1 - PCV) Never done    COVID-19 Vaccine (3 - Booster for Pfizer series) 02/07/2022    Potassium  04/05/2022    Creatinine  04/05/2022    Flu vaccine (Season Ended) 09/01/2022    Depression Monitoring  10/08/2022    DTaP/Tdap/Td vaccine (2 - Td or Tdap) 05/08/2023    Lipids  04/05/2026    Hepatitis C screen  Completed    Hepatitis A vaccine  Aged Out    Hepatitis B vaccine  Aged Out    Hib vaccine  Aged Out    Meningococcal (ACWY) vaccine  Aged Out       No results found for: LABA1C          ( goal A1C is < 7)   No results found for: LABMICR  LDL Cholesterol (mg/dL)   Date Value   04/05/2021 137 (H)       (goal LDL is <100)   AST (U/L)   Date Value   04/05/2021 22     ALT (U/L)   Date Value   04/05/2021 18     BUN (mg/dL)   Date Value   04/05/2021 19     BP Readings from Last 3 Encounters:   04/11/22 130/80   01/10/22 120/70   10/08/21 134/82          (goal 120/80)    All Future Testing planned in [facility]  Lab Frequency Next Occurrence   Comprehensive Metabolic Panel Once 30/76/2407   Vitamin D 25 Hydroxy Once 04/18/2022   Lipid Panel Once 04/18/2022   CHANDRA DIGITAL SCREEN W OR WO CAD BILATERAL Once 04/25/2022               Patient Active Problem List:     Foreign body of thumb, left, infected     Lumbosacral neuritis     Allergic rhinitis     H/O total hysterectomy     Hypertension     Anxiety     Neuropathy     Mixed hyperlipidemia

## 2022-06-07 DIAGNOSIS — F41.9 ANXIETY: Primary | ICD-10-CM

## 2022-06-07 DIAGNOSIS — G89.29 CHRONIC MIDLINE LOW BACK PAIN WITHOUT SCIATICA: ICD-10-CM

## 2022-06-07 DIAGNOSIS — M54.50 CHRONIC MIDLINE LOW BACK PAIN WITHOUT SCIATICA: ICD-10-CM

## 2022-06-07 RX ORDER — LORAZEPAM 0.5 MG/1
TABLET ORAL
Qty: 90 TABLET | Refills: 0 | Status: SHIPPED | OUTPATIENT
Start: 2022-06-07 | End: 2022-07-07

## 2022-06-07 RX ORDER — OXYCODONE HYDROCHLORIDE AND ACETAMINOPHEN 5; 325 MG/1; MG/1
1 TABLET ORAL EVERY 4 HOURS PRN
Qty: 90 TABLET | Refills: 0 | Status: SHIPPED | OUTPATIENT
Start: 2022-06-07 | End: 2022-07-07 | Stop reason: SDUPTHER

## 2022-06-07 NOTE — TELEPHONE ENCOUNTER
[facility] is calling to request a refill on the following medication(s):    Last Visit Date (If Applicable):  4/18/7710    Next Visit Date:    7/11/2022    Medication Request:  Requested Prescriptions     Pending Prescriptions Disp Refills    oxyCODONE-acetaminophen (PERCOCET) 5-325 MG per tablet 90 tablet 0     Sig: Take 1 tablet by mouth every 4 hours as needed for Pain for up to 30 days.

## 2022-06-07 NOTE — TELEPHONE ENCOUNTER
Pharm requested    Health Maintenance   Topic Date Due    Colorectal Cancer Screen  Never done    Breast cancer screen  Never done    Shingles vaccine (1 of 2) Never done    DEXA (modify frequency per FRAX score)  Never done    Pneumococcal 65+ years Vaccine (1 - PCV) Never done    COVID-19 Vaccine (3 - Booster for Recinos Peter series) 02/07/2022    Flu vaccine (Season Ended) 09/01/2022    Depression Monitoring  10/08/2022    DTaP/Tdap/Td vaccine (2 - Td or Tdap) 05/08/2023    Lipids  04/05/2026    Hepatitis C screen  Completed    Hepatitis A vaccine  Aged Out    Hepatitis B vaccine  Aged Out    Hib vaccine  Aged Out    Meningococcal (ACWY) vaccine  Aged Out             (applicable per patient's age: Cancer Screenings, Depression Screening, Fall Risk Screening, Immunizations)    LDL Cholesterol (mg/dL)   Date Value   04/05/2021 137 (H)     AST (U/L)   Date Value   04/05/2021 22     ALT (U/L)   Date Value   04/05/2021 18     BUN (mg/dL)   Date Value   04/05/2021 19      (goal A1C is < 7)   (goal LDL is <100) need 30-50% reduction from baseline     BP Readings from Last 3 Encounters:   04/11/22 130/80   01/10/22 120/70   10/08/21 134/82    (goal /80)      All Future Testing planned in [facility]:  Lab Frequency Next Occurrence   Comprehensive Metabolic Panel Once 41/72/9588   Vitamin D 25 Hydroxy Once 04/18/2022   Lipid Panel Once 04/18/2022   CHANDRA DIGITAL SCREEN W OR WO CAD BILATERAL Once 04/25/2022       Next Visit Date:  Future Appointments   Date Time Provider Yonatan Moreno   7/11/2022 10:15 AM [facility] [address] [facility]            Patient Active Problem List:     Foreign body of thumb, left, infected     Lumbosacral neuritis     Allergic rhinitis     H/O total hysterectomy     Hypertension     Anxiety     Neuropathy     Mixed hyperlipidemia

## 2022-06-14 ENCOUNTER — HOSPITAL ENCOUNTER (OUTPATIENT)
Age: 67
Setting detail: SPECIMEN
Discharge: HOME | End: 2022-06-14

## 2022-06-14 DIAGNOSIS — Z86.19 HX OF HEPATITIS: ICD-10-CM

## 2022-06-14 DIAGNOSIS — Z86.19 HX OF HEPATITIS: Primary | ICD-10-CM

## 2022-06-14 DIAGNOSIS — E78.2 MIXED HYPERLIPIDEMIA: ICD-10-CM

## 2022-06-14 DIAGNOSIS — E55.9 VITAMIN D DEFICIENCY: ICD-10-CM

## 2022-06-14 DIAGNOSIS — I10 PRIMARY HYPERTENSION: ICD-10-CM

## 2022-06-14 LAB
ABSOLUTE EOS #: 0.38 K/UL (ref 0–0.44)
ABSOLUTE IMMATURE GRANULOCYTE: <0.03 K/UL (ref 0–0.3)
ABSOLUTE LYMPH #: 0.82 K/UL (ref 1.1–3.7)
ABSOLUTE MONO #: 0.61 K/UL (ref 0.1–1.2)
ALBUMIN SERPL-MCNC: 4.8 G/DL (ref 3.5–5.2)
ALBUMIN/GLOBULIN RATIO: 1.3 (ref 1–2.5)
ALP BLD-CCNC: 81 U/L (ref 35–104)
ALT SERPL-CCNC: 25 U/L (ref 5–33)
ANION GAP SERPL CALCULATED.3IONS-SCNC: 19 MMOL/L (ref 9–17)
AST SERPL-CCNC: 34 U/L
BASOPHILS # BLD: 1 % (ref 0–2)
BASOPHILS ABSOLUTE: 0.06 K/UL (ref 0–0.2)
BILIRUB SERPL-MCNC: 1.31 MG/DL (ref 0.3–1.2)
BUN BLDV-MCNC: 19 MG/DL (ref 8–23)
CALCIUM SERPL-MCNC: 10.6 MG/DL (ref 8.6–10.4)
CHLORIDE BLD-SCNC: 97 MMOL/L (ref 98–107)
CHOLESTEROL/HDL RATIO: 6.2
CHOLESTEROL: 243 MG/DL
CO2: 26 MMOL/L (ref 20–31)
CREAT SERPL-MCNC: 1.27 MG/DL (ref 0.5–0.9)
EOSINOPHILS RELATIVE PERCENT: 6 % (ref 1–4)
GFR AFRICAN AMERICAN: 51 ML/MIN
GFR NON-AFRICAN AMERICAN: 42 ML/MIN
GFR SERPL CREATININE-BSD FRML MDRD: ABNORMAL ML/MIN/{1.73_M2}
GLUCOSE BLD-MCNC: 105 MG/DL (ref 70–99)
HCT VFR BLD CALC: 47.7 % (ref 36.3–47.1)
HDLC SERPL-MCNC: 39 MG/DL
HEMOGLOBIN: 15.3 G/DL (ref 11.9–15.1)
IMMATURE GRANULOCYTES: 0 %
LDL CHOLESTEROL: 170 MG/DL (ref 0–130)
LYMPHOCYTES # BLD: 12 % (ref 24–43)
MCH RBC QN AUTO: 28.1 PG (ref 25.2–33.5)
MCHC RBC AUTO-ENTMCNC: 32.1 G/DL (ref 28.4–34.8)
MCV RBC AUTO: 87.5 FL (ref 82.6–102.9)
MONOCYTES # BLD: 9 % (ref 3–12)
NRBC AUTOMATED: 0 PER 100 WBC
PDW BLD-RTO: 12.9 % (ref 11.8–14.4)
PLATELET # BLD: 318 K/UL (ref 138–453)
PMV BLD AUTO: 10.8 FL (ref 8.1–13.5)
POTASSIUM SERPL-SCNC: 4.3 MMOL/L (ref 3.7–5.3)
RBC # BLD: 5.45 M/UL (ref 3.95–5.11)
SEG NEUTROPHILS: 72 % (ref 36–65)
SEGMENTED NEUTROPHILS ABSOLUTE COUNT: 4.95 K/UL (ref 1.5–8.1)
SODIUM BLD-SCNC: 142 MMOL/L (ref 135–144)
TOTAL PROTEIN: 8.4 G/DL (ref 6.4–8.3)
TRIGL SERPL-MCNC: 171 MG/DL
VITAMIN D 25-HYDROXY: 22.3 NG/ML
WBC # BLD: 6.8 K/UL (ref 3.5–11.3)

## 2022-06-15 LAB
HAV IGM SER IA-ACNC: NONREACTIVE
HBV SURFACE AB TITR SER: 4.04 MIU/ML
HEPATITIS B CORE IGM ANTIBODY: NONREACTIVE
HEPATITIS C ANTIBODY: NONREACTIVE

## 2022-06-22 ENCOUNTER — TELEPHONE (OUTPATIENT)
Dept: FAMILY MEDICINE CLINIC | Age: 67
End: 2022-06-22

## 2022-06-22 NOTE — TELEPHONE ENCOUNTER
Patient called in stating hs ewas taken in to the hospital last night by ambulance and she stated they took all the blood work you  Had requested she get done before her next appt and she wants to know if you can send a request for the labs [facility].    She did state she was released last night.     Please advise

## 2022-06-24 ENCOUNTER — TELEMEDICINE (OUTPATIENT)
Dept: FAMILY MEDICINE CLINIC | Age: 67
End: 2022-06-24
Payer: COMMERCIAL

## 2022-06-24 DIAGNOSIS — F41.9 ANXIETY: Primary | ICD-10-CM

## 2022-06-24 DIAGNOSIS — N28.9 RENAL INSUFFICIENCY: ICD-10-CM

## 2022-06-24 DIAGNOSIS — Q60.0 CONGENITAL SINGLE KIDNEY: ICD-10-CM

## 2022-06-24 DIAGNOSIS — R79.9 ABNORMAL BLOOD CELL COUNT: ICD-10-CM

## 2022-06-24 DIAGNOSIS — R74.8 ELEVATED PANCREATIC ENZYME: ICD-10-CM

## 2022-06-24 DIAGNOSIS — I10 ESSENTIAL HYPERTENSION: ICD-10-CM

## 2022-06-24 PROCEDURE — 99214 OFFICE O/P EST MOD 30 MIN: CPT | Performed by: FAMILY MEDICINE

## 2022-06-24 PROCEDURE — 1123F ACP DISCUSS/DSCN MKR DOCD: CPT | Performed by: FAMILY MEDICINE

## 2022-06-24 ASSESSMENT — PATIENT HEALTH QUESTIONNAIRE - PHQ9
SUM OF ALL RESPONSES TO PHQ QUESTIONS 1-9: 0
SUM OF ALL RESPONSES TO PHQ9 QUESTIONS 1 & 2: 0
SUM OF ALL RESPONSES TO PHQ QUESTIONS 1-9: 0
7. TROUBLE CONCENTRATING ON THINGS, SUCH AS READING THE NEWSPAPER OR WATCHING TELEVISION: 0
9. THOUGHTS THAT YOU WOULD BE BETTER OFF DEAD, OR OF HURTING YOURSELF: 0
SUM OF ALL RESPONSES TO PHQ QUESTIONS 1-9: 0
1. LITTLE INTEREST OR PLEASURE IN DOING THINGS: 0
2. FEELING DOWN, DEPRESSED OR HOPELESS: 0
10. IF YOU CHECKED OFF ANY PROBLEMS, HOW DIFFICULT HAVE THESE PROBLEMS MADE IT FOR YOU TO DO YOUR WORK, TAKE CARE OF THINGS AT HOME, OR GET ALONG WITH OTHER PEOPLE: 0
6. FEELING BAD ABOUT YOURSELF - OR THAT YOU ARE A FAILURE OR HAVE LET YOURSELF OR YOUR FAMILY DOWN: 0
5. POOR APPETITE OR OVEREATING: 0
3. TROUBLE FALLING OR STAYING ASLEEP: 0
8. MOVING OR SPEAKING SO SLOWLY THAT OTHER PEOPLE COULD HAVE NOTICED. OR THE OPPOSITE, BEING SO FIGETY OR RESTLESS THAT YOU HAVE BEEN MOVING AROUND A LOT MORE THAN USUAL: 0
4. FEELING TIRED OR HAVING LITTLE ENERGY: 0
SUM OF ALL RESPONSES TO PHQ QUESTIONS 1-9: 0

## 2022-06-24 ASSESSMENT — ENCOUNTER SYMPTOMS
COUGH: 0
SHORTNESS OF BREATH: 0
CONSTIPATION: 0
BLOOD IN STOOL: 0
ABDOMINAL PAIN: 0
ALLERGIC/IMMUNOLOGIC NEGATIVE: 1
DIARRHEA: 0
EYES NEGATIVE: 1

## 2022-06-24 NOTE — PROGRESS NOTES
[facility]  [address]  [place] [address] [facility] [address]  Dept: [phone]    2022    TELEHEALTH EVALUATION -- Audio/Visual (During RIOFP-25 public health emergency)  Niles Puga (:  1955) has requested an audio/video evaluation for the following concern(s):    HPI:  Video visit to discuss recent lab results. She was called by an MA from this office and informed that her kidneys were abnormal and she became very concerned and had a panic attack. Ended up being taken to ED by shi. Was taken to [facility]. Review of labs showed normal blood count, elevated creatinine and elevated pancreatic enzymes. Nausea resolved with Zofran. No imaging was done at that time. She reports history of having cholecystectomy done during hysterectomy in the remote past.  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       There were no vitals filed for this visit. REVIEW OF SYSTEMS:   Review of Systems   Constitutional: Negative for fatigue, fever and unexpected weight change. HENT: Negative. Eyes: Negative. Respiratory: Negative for cough and shortness of breath. Cardiovascular: Negative for chest pain and leg swelling. Gastrointestinal: Negative for abdominal pain, blood in stool, constipation and diarrhea. Endocrine: Negative. Genitourinary: Negative for frequency and urgency. Musculoskeletal: Negative. Skin: Negative. Allergic/Immunologic: Negative. Neurological: Negative for dizziness and headaches. Hematological: Negative. Psychiatric/Behavioral: Negative for sleep disturbance. The patient is nervous/anxious (controlled with meds). All other systems reviewed and are negative.       PAST MEDICAL HISTORY:    Past Medical History:   Diagnosis Date    Allergic rhinitis     Anxiety     Back pain, chronic     Broken teeth     broken front teeth from intubation during general anesthesia [facility])    Congenital single kidney     Foreign body of thumb, left, infected 05/05/2013    Wooden sliver of left thumb (5/5/2013). Treated with antibiotics, responding well.  Hepatitis A infection 1972 or 1973    history of sarthak this in college (from lab rats)    Hypertension     Mixed hyperlipidemia     Neuropathy     feet    Osteoarthritis     BACK    Post-operative nausea and vomiting     Status post dental restoration     front teeth replaced by dental implants (posts)    Wears contact lenses        FAMILY HISTORY:    Family History   Problem Relation Age of Onset    High Blood Pressure Father     High Blood Pressure Sister        SOCIAL HISTORY:    Social History     Socioeconomic History    Marital status:      Spouse name: None    Number of children: 3    Years of education: None    Highest education level: None   Occupational History    None   Tobacco Use    Smoking status: Never Smoker    Smokeless tobacco: Never Used   Substance and Sexual Activity    Alcohol use: No     Comment: States that she used to drink socially 20 years ago, but gave it up.  Drug use: No    Sexual activity: Not Currently   Other Topics Concern    None   Social History Narrative    None     Social Determinants of Health     Financial Resource Strain: Low Risk     Difficulty of Paying Living Expenses: Not hard at all   Food Insecurity: No Food Insecurity    Worried About Running Out of Food in the Last Year: Never true    Mariela of Food in the Last Year: Never true   Transportation Needs: No Transportation Needs    Lack of Transportation (Medical): No    Lack of Transportation (Non-Medical):  No   Physical Activity:     Days of Exercise per Week: Not on file    Minutes of Exercise per Session: Not on file   Stress:     Feeling of Stress : Not on file   Social Connections:     Frequency of Communication with Friends and Family: Not on file    Frequency of Social Gatherings with Friends and Family: Not on file    Attends Orthodox Services: Not on file    Active Member of Clubs or Organizations: Not on file    Attends Club or Organization Meetings: Not on file    Marital Status: Not on file   Intimate Partner Violence:     Fear of Current or Ex-Partner: Not on file    Emotionally Abused: Not on file    Physically Abused: Not on file    Sexually Abused: Not on file   Housing Stability:     Unable to Pay for Housing in the Last Year: Not on file    Number of [place] in the Last Year: Not on file    Unstable Housing in the Last Year: Not on file       SURGICAL HISTORY:    Past Surgical History:   Procedure Laterality Date    APPENDECTOMY  2003    CHOLECYSTECTOMY      during hysterectomy   [id number] [facility] Right 05/17/2013    hand    HYSTERECTOMY (CERVIX STATUS UNKNOWN)  2003    LUMBAR DISC SURGERY      x 3    NERVE BLOCK  11/19/2014    duramorph, decadron 10mg    TONSILLECTOMY      TUBAL LIGATION         CURRENT MEDICATIONS:    Current Outpatient Medications   Medication Sig Dispense Refill    oxyCODONE-acetaminophen (PERCOCET) 5-325 MG per tablet Take 1 tablet by mouth every 4 hours as needed for Pain for up to 30 days. 90 tablet 0    LORazepam (ATIVAN) 0.5 MG tablet TAKE ONE TABLET BY MOUTH EVERY 8 HOURS AS NEEDED FOR ANXIETY 90 tablet 0    citalopram (CELEXA) 20 MG tablet TAKE ONE TABLET BY MOUTH DAILY 30 tablet 5    hydroCHLOROthiazide (HYDRODIURIL) 12.5 MG tablet TAKE ONE TABLET BY MOUTH DAILY 60 tablet 5     No current facility-administered medications for this visit. ALLERGIES:   Allergies   Allergen Reactions    Bactrim [Sulfamethoxazole-Trimethoprim] Swelling     Facial swelling    Lisinopril Other (See Comments)     Cough, keeping her awake at night       PHYSICAL EXAM:   Physical Exam  Vitals reviewed. Constitutional:       Appearance: Normal appearance. HENT:      Head: Normocephalic.    Eyes:      Extraocular Movements: Extraocular movements intact. Conjunctiva/sclera: Conjunctivae normal.   Pulmonary:      Effort: Pulmonary effort is normal.   Musculoskeletal:         General: Normal range of motion. Cervical back: Normal range of motion. Skin:     Findings: No rash. Neurological:      General: No focal deficit present. Mental Status: She is alert and oriented to person, place, and time. Mental status is at baseline. Psychiatric:         Mood and Affect: Mood normal.         Behavior: Behavior normal.         Thought Content: Thought content normal.         Judgment: Judgment normal.          ASSESSMENT/PLAN:  1. Anxiety  Continue lorazepam as needed. Continue Celexa daily. Reassured that lab results were not in dangerous levels. 2. Essential hypertension  Recheck labs prior to next appointment. Continue hydrochlorothiazide for now. - Comprehensive Metabolic Panel; Future    3. Renal insufficiency  Stay well-hydrated. May need to hydrochlorothiazide if creatinine still elevated. - Comprehensive Metabolic Panel; Future  - US ABDOMEN COMPLETE; Future    4. Elevated pancreatic enzyme  Recheck labs and ultrasound. Avoid alcohol which she has not had to drink for 42 years. - Amylase; Future  - Lipase; Future  - US ABDOMEN COMPLETE; Future    5. Abnormal blood cell count  Recheck levels. Report any symptoms of infection  - CBC with Auto Differential; Future    6. Congenital single kidney  Stay well-hydrated and verified by ultrasound. - US ABDOMEN COMPLETE; Future      No follow-ups on file. Mao Harrison is a 74 y.o. female being evaluated by a Virtual Visit (video visit) encounter to address concerns as mentioned above. A caregiver was present when appropriate. Due to this being a TeleHealth encounter (During [facility]- public health emergency), evaluation of the following organ systems was limited: Vitals/Constitutional/EENT/Resp/CV/GI//MS/Neuro/Skin/Heme-Lymph-Imm.   Pursuant to the emergency declaration under the [facility] waiver authority and the [employer] Act, this Virtual Visit was conducted with patient's (and/or legal guardian's) consent, to reduce the patient's risk of exposure to COVID-19 and provide necessary medical care. The patient (and/or legal guardian) has also been advised to contact this office for worsening conditions or problems, and seek emergency medical treatment and/or call 911 if deemed necessary. Services were provided through a video synchronous discussion virtually to substitute for in-person clinic visit. Patient and provider were located at their individual homes. --Silverio Faust MD on 6/24/2022 at 9:52 AM    An electronic signature was used to authenticate this note.

## 2022-07-07 DIAGNOSIS — G89.29 CHRONIC MIDLINE LOW BACK PAIN WITHOUT SCIATICA: ICD-10-CM

## 2022-07-07 DIAGNOSIS — M54.50 CHRONIC MIDLINE LOW BACK PAIN WITHOUT SCIATICA: ICD-10-CM

## 2022-07-07 RX ORDER — OXYCODONE HYDROCHLORIDE AND ACETAMINOPHEN 5; 325 MG/1; MG/1
1 TABLET ORAL EVERY 4 HOURS PRN
Qty: 90 TABLET | Refills: 0 | Status: SHIPPED | OUTPATIENT
Start: 2022-07-07 | End: 2022-08-08 | Stop reason: SDUPTHER

## 2022-07-07 NOTE — TELEPHONE ENCOUNTER
----- Message from Lc Pang sent at 7/7/2022 11:23 AM EDT -----  Subject: Refill Request    QUESTIONS  Name of Medication? oxyCODONE-acetaminophen (PERCOCET) 5-325 MG per tablet  Patient-reported dosage and instructions? 5-325 mg  How many days do you have left? 1  [id number]  Pharmacy phone number (if available)? [phone]  ---------------------------------------------------------------------------  --------------,  Name of Medication? LORazepam (ATIVAN) 0.5 MG tablet  Patient-reported dosage and instructions? 0.5 mg  How many days do you have left? 2  Preferred Pharmacy? [place] [address] [id number]  Pharmacy phone number (if available)? [phone]  ---------------------------------------------------------------------------  --------------  CALL BACK INFO  What is the best way for the office to contact you? OK to leave message on   voicemail  Preferred Call Back Phone Number? [phone]  ---------------------------------------------------------------------------  --------------  SCRIPT ANSWERS  Relationship to Patient?  Self

## 2022-07-08 DIAGNOSIS — F41.9 ANXIETY: Primary | ICD-10-CM

## 2022-07-08 RX ORDER — LORAZEPAM 0.5 MG/1
TABLET ORAL
Qty: 90 TABLET | Refills: 0 | Status: SHIPPED | OUTPATIENT
Start: 2022-07-08 | End: 2022-08-07

## 2022-07-11 ENCOUNTER — HOSPITAL ENCOUNTER (OUTPATIENT)
Age: 67
Setting detail: SPECIMEN
Discharge: HOME | End: 2022-07-11

## 2022-07-11 ENCOUNTER — OFFICE VISIT (OUTPATIENT)
Dept: FAMILY MEDICINE CLINIC | Age: 67
End: 2022-07-11
Payer: COMMERCIAL

## 2022-07-11 VITALS
SYSTOLIC BLOOD PRESSURE: 128 MMHG | WEIGHT: 167.8 LBS | BODY MASS INDEX: 27.08 KG/M2 | OXYGEN SATURATION: 98 % | DIASTOLIC BLOOD PRESSURE: 68 MMHG | HEART RATE: 70 BPM

## 2022-07-11 DIAGNOSIS — R79.9 ABNORMAL BLOOD CELL COUNT: ICD-10-CM

## 2022-07-11 DIAGNOSIS — I10 ESSENTIAL HYPERTENSION: ICD-10-CM

## 2022-07-11 DIAGNOSIS — R74.8 ELEVATED PANCREATIC ENZYME: ICD-10-CM

## 2022-07-11 DIAGNOSIS — I10 ESSENTIAL HYPERTENSION: Primary | ICD-10-CM

## 2022-07-11 DIAGNOSIS — N28.9 RENAL INSUFFICIENCY: ICD-10-CM

## 2022-07-11 DIAGNOSIS — G89.29 CHRONIC MIDLINE LOW BACK PAIN WITHOUT SCIATICA: ICD-10-CM

## 2022-07-11 DIAGNOSIS — Q60.0 CONGENITAL SINGLE KIDNEY: ICD-10-CM

## 2022-07-11 DIAGNOSIS — M54.50 CHRONIC MIDLINE LOW BACK PAIN WITHOUT SCIATICA: ICD-10-CM

## 2022-07-11 DIAGNOSIS — R74.8 ELEVATED LIVER ENZYMES: ICD-10-CM

## 2022-07-11 LAB
ABSOLUTE EOS #: 0.3 K/UL (ref 0–0.44)
ABSOLUTE IMMATURE GRANULOCYTE: <0.03 K/UL (ref 0–0.3)
ABSOLUTE LYMPH #: 0.82 K/UL (ref 1.1–3.7)
ABSOLUTE MONO #: 0.63 K/UL (ref 0.1–1.2)
ALBUMIN SERPL-MCNC: 4.4 G/DL (ref 3.5–5.2)
ALBUMIN/GLOBULIN RATIO: 1.4 (ref 1–2.5)
ALP BLD-CCNC: 69 U/L (ref 35–104)
ALT SERPL-CCNC: 29 U/L (ref 5–33)
AMYLASE: 99 U/L (ref 28–100)
ANION GAP SERPL CALCULATED.3IONS-SCNC: 14 MMOL/L (ref 9–17)
AST SERPL-CCNC: 37 U/L
BASOPHILS # BLD: 1 % (ref 0–2)
BASOPHILS ABSOLUTE: 0.05 K/UL (ref 0–0.2)
BILIRUB SERPL-MCNC: 1.17 MG/DL (ref 0.3–1.2)
BUN BLDV-MCNC: 24 MG/DL (ref 8–23)
CALCIUM SERPL-MCNC: 10 MG/DL (ref 8.6–10.4)
CHLORIDE BLD-SCNC: 98 MMOL/L (ref 98–107)
CO2: 28 MMOL/L (ref 20–31)
CREAT SERPL-MCNC: 1.35 MG/DL (ref 0.5–0.9)
EOSINOPHILS RELATIVE PERCENT: 6 % (ref 1–4)
GFR AFRICAN AMERICAN: 47 ML/MIN
GFR NON-AFRICAN AMERICAN: 39 ML/MIN
GFR SERPL CREATININE-BSD FRML MDRD: ABNORMAL ML/MIN/{1.73_M2}
GLUCOSE BLD-MCNC: 107 MG/DL (ref 70–99)
HCT VFR BLD CALC: 41.8 % (ref 36.3–47.1)
HEMOGLOBIN: 13.9 G/DL (ref 11.9–15.1)
IMMATURE GRANULOCYTES: 0 %
LIPASE: 57 U/L (ref 13–60)
LYMPHOCYTES # BLD: 17 % (ref 24–43)
MCH RBC QN AUTO: 28.5 PG (ref 25.2–33.5)
MCHC RBC AUTO-ENTMCNC: 33.3 G/DL (ref 28.4–34.8)
MCV RBC AUTO: 85.7 FL (ref 82.6–102.9)
MONOCYTES # BLD: 13 % (ref 3–12)
NRBC AUTOMATED: 0 PER 100 WBC
PDW BLD-RTO: 12.6 % (ref 11.8–14.4)
PLATELET # BLD: 256 K/UL (ref 138–453)
PMV BLD AUTO: 10.6 FL (ref 8.1–13.5)
POTASSIUM SERPL-SCNC: 4.2 MMOL/L (ref 3.7–5.3)
RBC # BLD: 4.88 M/UL (ref 3.95–5.11)
SEG NEUTROPHILS: 63 % (ref 36–65)
SEGMENTED NEUTROPHILS ABSOLUTE COUNT: 3.04 K/UL (ref 1.5–8.1)
SODIUM BLD-SCNC: 140 MMOL/L (ref 135–144)
TOTAL PROTEIN: 7.5 G/DL (ref 6.4–8.3)
WBC # BLD: 4.9 K/UL (ref 3.5–11.3)

## 2022-07-11 PROCEDURE — 99214 OFFICE O/P EST MOD 30 MIN: CPT | Performed by: FAMILY MEDICINE

## 2022-07-11 PROCEDURE — 1123F ACP DISCUSS/DSCN MKR DOCD: CPT | Performed by: FAMILY MEDICINE

## 2022-07-11 SDOH — ECONOMIC STABILITY: FOOD INSECURITY: WITHIN THE PAST 12 MONTHS, THE FOOD YOU BOUGHT JUST DIDN'T LAST AND YOU DIDN'T HAVE MONEY TO GET MORE.: NEVER TRUE

## 2022-07-11 SDOH — ECONOMIC STABILITY: FOOD INSECURITY: WITHIN THE PAST 12 MONTHS, YOU WORRIED THAT YOUR FOOD WOULD RUN OUT BEFORE YOU GOT MONEY TO BUY MORE.: NEVER TRUE

## 2022-07-11 ASSESSMENT — ENCOUNTER SYMPTOMS
ABDOMINAL PAIN: 0
CONSTIPATION: 0
SHORTNESS OF BREATH: 0
BACK PAIN: 1
DIARRHEA: 0
EYES NEGATIVE: 1
ALLERGIC/IMMUNOLOGIC NEGATIVE: 1
COUGH: 0

## 2022-07-11 ASSESSMENT — SOCIAL DETERMINANTS OF HEALTH (SDOH): HOW HARD IS IT FOR YOU TO PAY FOR THE VERY BASICS LIKE FOOD, HOUSING, MEDICAL CARE, AND HEATING?: NOT HARD AT ALL

## 2022-07-11 NOTE — PROGRESS NOTES
[address] [place] [address] [facility] [address]  Dept: [phone]    7/11/2022    CHIEF COMPLAINT    Chief Complaint   Patient presents with    Chronic Pain     med check       HPI    Bradford Sykes is a 74 y.o. female who presents   Chief Complaint   Patient presents with    Chronic Pain     med check   . Recheck chronic conditions including htn, elevated liver enzymes, renal insufficiency, depression and anxiety. Had labs done today to recheck kidney function, electrolytes and liver function. Ultrasound had been ordered but was not completed as the scheduling department wanted separate orders. Anxiety  is controlled with Ativan as prescribed. Back pain is present daily but managed with daily pain medication. Vitals:    07/11/22 1029   BP: 128/68   Pulse: 70   SpO2: 98%   Weight: 167 lb 12.8 oz (76.1 kg)       REVIEW OF SYSTEMS    Review of Systems   Constitutional: Negative for fatigue, fever and unexpected weight change. HENT: Negative. Eyes: Negative. Respiratory: Negative for cough and shortness of breath. Cardiovascular: Negative for chest pain and leg swelling. Gastrointestinal: Negative for abdominal pain, constipation and diarrhea. Endocrine: Negative. Genitourinary: Negative for frequency and urgency. Musculoskeletal: Positive for arthralgias and back pain. Skin: Negative. Allergic/Immunologic: Negative. Neurological: Negative for dizziness and headaches. Hematological: Negative. Psychiatric/Behavioral: Negative for sleep disturbance. The patient is nervous/anxious.         PAST MEDICAL HISTORY    Past Medical History:   Diagnosis Date    Allergic rhinitis     Anxiety     Back pain, chronic     Broken teeth     broken front teeth from intubation during general anesthesia [facility])    Congenital single kidney     Foreign body of thumb, left, infected 05/05/2013    Felix sliver of left thumb (5/5/2013). Treated with antibiotics, responding well.  Hepatitis A infection 1972 or 1973    history of sarthak this in college (from lab rats)    Hypertension     Mixed hyperlipidemia     Neuropathy     feet    Osteoarthritis     BACK    Post-operative nausea and vomiting     Status post dental restoration     front teeth replaced by dental implants (posts)    Wears contact lenses        FAMILY HISTORY    Family History   Problem Relation Age of Onset    High Blood Pressure Father     High Blood Pressure Sister        SOCIAL HISTORY    Social History     Socioeconomic History    Marital status:      Spouse name: None    Number of children: 3    Years of education: None    Highest education level: None   Occupational History    None   Tobacco Use    Smoking status: Never Smoker    Smokeless tobacco: Never Used   Substance and Sexual Activity    Alcohol use: No     Comment: States that she used to drink socially 20 years ago, but gave it up.  Drug use: No    Sexual activity: Not Currently   Other Topics Concern    None   Social History Narrative    None     Social Determinants of Health     Financial Resource Strain: Low Risk     Difficulty of Paying Living Expenses: Not hard at all   Food Insecurity: No Food Insecurity    Worried About Running Out of Food in the Last Year: Never true    Mariela of Food in the Last Year: Never true   Transportation Needs:     Lack of Transportation (Medical): Not on file    Lack of Transportation (Non-Medical):  Not on file   Physical Activity:     Days of Exercise per Week: Not on file    Minutes of Exercise per Session: Not on file   Stress:     Feeling of Stress : Not on file   Social Connections:     Frequency of Communication with Friends and Family: Not on file    Frequency of Social Gatherings with Friends and Family: Not on file    Attends Latter day Services: Not on file   [employer] of Clubs or Organizations: Not on file    Attends Club or Organization Meetings: Not on file    Marital Status: Not on file   Intimate Partner Violence:     Fear of Current or Ex-Partner: Not on file    Emotionally Abused: Not on file    Physically Abused: Not on file    Sexually Abused: Not on file   Housing Stability:     Unable to Pay for Housing in the Last Year: Not on file    Number of Places Lived in the Last Year: Not on file    Unstable Housing in the Last Year: Not on file       SURGICAL HISTORY    Past Surgical History:   Procedure Laterality Date    APPENDECTOMY  2003    CHOLECYSTECTOMY      during hysterectomy    FOREIGN BODY REMOVAL Right 05/17/2013    hand    HYSTERECTOMY (CERVIX STATUS UNKNOWN)  2003    LUMBAR DISC SURGERY      x 3    NERVE BLOCK  11/19/2014    duramorph, decadron 10mg    TONSILLECTOMY      TUBAL LIGATION         CURRENT MEDICATIONS    Current Outpatient Medications   Medication Sig Dispense Refill    LORazepam (ATIVAN) 0.5 MG tablet TAKE ONE TABLET BY MOUTH EVERY 8 HOURS AS NEEDED FOR ANXIETY 90 tablet 0    oxyCODONE-acetaminophen (PERCOCET) 5-325 MG per tablet Take 1 tablet by mouth every 4 hours as needed for Pain for up to 30 days. 90 tablet 0    citalopram (CELEXA) 20 MG tablet TAKE ONE TABLET BY MOUTH DAILY 30 tablet 5    hydroCHLOROthiazide (HYDRODIURIL) 12.5 MG tablet TAKE ONE TABLET BY MOUTH DAILY 60 tablet 5     No current facility-administered medications for this visit. ALLERGIES    Allergies   Allergen Reactions    Bactrim [Sulfamethoxazole-Trimethoprim] Swelling     Facial swelling    Lisinopril Other (See Comments)     Cough, keeping her awake at night       PHYSICAL EXAM   Physical Exam  Vitals reviewed. Constitutional:       Appearance: She is well-developed. HENT:      Head: Normocephalic. Eyes:      Conjunctiva/sclera: Conjunctivae normal.      Pupils: Pupils are equal, round, and reactive to light. Neck:      Thyroid: No thyromegaly. Cardiovascular:      Rate and Rhythm: Normal rate and regular rhythm. Heart sounds: Normal heart sounds. No murmur heard. Pulmonary:      Effort: Pulmonary effort is normal.      Breath sounds: Normal breath sounds. No wheezing or rales. Abdominal:      Palpations: Abdomen is soft. Musculoskeletal:         General: Tenderness (low back) present. No deformity. Normal range of motion. Cervical back: Normal range of motion and neck supple. Lymphadenopathy:      Cervical: No cervical adenopathy. Skin:     General: Skin is warm and dry. Neurological:      Mental Status: She is alert and oriented to person, place, and time. Psychiatric:         Mood and Affect: Mood normal.         Behavior: Behavior normal.         Thought Content: Thought content normal.         Judgment: Judgment normal.         ASSESSMENT/PLAN  1. Essential hypertension  Controlled with hydrochlorothiazide and healthy lifestyle    2. Chronic midline low back pain without sciatica  Continue chronic pain medications as prescribed and activity within limitations    3. Renal insufficiency  Recheck labs and ultrasound  - US RENAL COMPLETE; Future    4. Elevated pancreatic enzyme  Recheck labs and ultrasound  - US PANCREAS; Future    5. Congenital single kidney  Continue monitoring labs  - US RENAL COMPLETE; Future    6. Elevated liver enzymes  Discuss after results are known  - US GALLBLADDER RUQ; Future  - US LIVER; Future       Donna received counseling on the following healthy behaviors: nutrition, exercise and medication adherence  Reviewed prior labs and health maintenance. Continue current medications, diet and exercise. Discussed use, benefit, and side effects of prescribed medications. Barriers to medication compliance addressed. Patient given educational materials - see patient instructions. All patient questions answered. Patient voiced understanding.       Return in about 3 months (around 10/11/2022) for chronic pain.         Electronically signed by Benita Maurer MD on 7/11/22 at 10:46 AM EDT

## 2022-07-14 ENCOUNTER — HOSPITAL ENCOUNTER (OUTPATIENT)
Dept: ULTRASOUND IMAGING | Age: 67
Discharge: HOME | End: 2022-07-16
Payer: COMMERCIAL

## 2022-07-14 DIAGNOSIS — R74.8 ELEVATED PANCREATIC ENZYME: ICD-10-CM

## 2022-07-14 DIAGNOSIS — Q60.0 CONGENITAL SINGLE KIDNEY: ICD-10-CM

## 2022-07-14 DIAGNOSIS — N28.9 RENAL INSUFFICIENCY: ICD-10-CM

## 2022-07-14 DIAGNOSIS — R74.8 ELEVATED LIVER ENZYMES: ICD-10-CM

## 2022-07-14 PROCEDURE — 76705 ECHO EXAM OF ABDOMEN: CPT

## 2022-07-14 PROCEDURE — 76770 US EXAM ABDO BACK WALL COMP: CPT

## 2022-08-03 NOTE — TELEPHONE ENCOUNTER
Pt stated she now has the rash her sister was diagnosed with scabies at the dermatologist pt is requesting the two medications her sister was prescribed have been pended please rck

## 2022-08-04 RX ORDER — TRIAMCINOLONE ACETONIDE 1 MG/ML
LOTION TOPICAL
Qty: 60 ML | Refills: 1 | Status: SHIPPED | OUTPATIENT
Start: 2022-08-04 | End: 2022-08-10

## 2022-08-04 RX ORDER — IVERMECTIN 3 MG/1
200 TABLET ORAL ONCE
Qty: 10 TABLET | Refills: 0 | Status: SHIPPED | OUTPATIENT
Start: 2022-08-04 | End: 2022-08-04

## 2022-08-08 DIAGNOSIS — M54.50 CHRONIC MIDLINE LOW BACK PAIN WITHOUT SCIATICA: ICD-10-CM

## 2022-08-08 DIAGNOSIS — G89.29 CHRONIC MIDLINE LOW BACK PAIN WITHOUT SCIATICA: ICD-10-CM

## 2022-08-08 DIAGNOSIS — F41.9 ANXIETY: Primary | ICD-10-CM

## 2022-08-08 RX ORDER — OXYCODONE HYDROCHLORIDE AND ACETAMINOPHEN 5; 325 MG/1; MG/1
1 TABLET ORAL EVERY 4 HOURS PRN
Qty: 90 TABLET | Refills: 0 | Status: SHIPPED | OUTPATIENT
Start: 2022-08-08 | End: 2022-09-06 | Stop reason: SDUPTHER

## 2022-08-08 RX ORDER — LORAZEPAM 0.5 MG/1
TABLET ORAL
Qty: 90 TABLET | Refills: 0 | Status: SHIPPED | OUTPATIENT
Start: 2022-08-08 | End: 2022-09-06 | Stop reason: SDUPTHER

## 2022-08-08 NOTE — TELEPHONE ENCOUNTER
Honorio Qiu is calling to request a refill on the following medication(s):    Last Visit Date (If Applicable):  9/13/1543    Next Visit Date:    10/10/2022    Medication Request:  Requested Prescriptions     Pending Prescriptions Disp Refills    LORazepam (ATIVAN) 0.5 MG tablet [Pharmacy Med Name: LORAZEPAM 0.5 MG TABLET] 90 tablet      Sig: TAKE ONE TABLET BY MOUTH EVERY 8 HOURS AS NEEDED FOR ANXIETY

## 2022-08-16 DIAGNOSIS — I10 ESSENTIAL HYPERTENSION: ICD-10-CM

## 2022-08-16 DIAGNOSIS — T14.8XXA OPEN WOUND: Primary | ICD-10-CM

## 2022-08-16 RX ORDER — HYDROCHLOROTHIAZIDE 12.5 MG/1
TABLET ORAL
Qty: 90 TABLET | Refills: 3 | Status: SHIPPED | OUTPATIENT
Start: 2022-08-16

## 2022-08-16 RX ORDER — CEPHALEXIN 500 MG/1
500 CAPSULE ORAL 3 TIMES DAILY
Qty: 21 CAPSULE | Refills: 0 | Status: SHIPPED | OUTPATIENT
Start: 2022-08-16 | End: 2022-08-23

## 2022-08-16 NOTE — TELEPHONE ENCOUNTER
Patient has a infected wound on the  ball of her left foot. Patient has neuropathy, so she didn't feel it right  away. Patient treated with home remedies and has been able to promote some  healing, but it's still clearly infected (very red and painful). Patient not okay with seeing another provider, is it okay to schedule a virtual visit being provider schedule is booked into October. Please advise.  Thank you

## 2022-08-16 NOTE — TELEPHONE ENCOUNTER
----- Message from Harmony Virgil sent at 8/16/2022  1:03 PM EDT -----  Subject: Appointment Request    Reason for Call: Established Patient Appointment needed: Urgent (Patient   Request) No Script    QUESTIONS    Reason for appointment request? Available appointments did not meet   patient need     Additional Information for Provider? Patient has a infected wound on the   ball of her left foot. Patient has neuropathy, so she didn't feel it right   away. Patient treated with home remedies and has been able to promote some   healing, but it's still clearly infected (very red and painful). First   available appt w/ her PCP is in mid-Oct which doesn't meet her pressing   needs to be seen.  Please call patient to schedule ASAP.  ---------------------------------------------------------------------------  --------------  Efren BARON  [phone]; OK to leave message on voicemail  ---------------------------------------------------------------------------  --------------  SCRIPT ANSWERS  GIULIANO Screen: Russel Springer

## 2022-08-16 NOTE — TELEPHONE ENCOUNTER
Pt is requesting abx to [facility] [address] Pt has appt Friday vv pt is asking if there is a phone she can send photo to net too good with [employer]

## 2022-08-19 ENCOUNTER — OFFICE VISIT (OUTPATIENT)
Dept: FAMILY MEDICINE CLINIC | Age: 67
End: 2022-08-19
Payer: COMMERCIAL

## 2022-08-19 VITALS
HEART RATE: 84 BPM | WEIGHT: 165.8 LBS | SYSTOLIC BLOOD PRESSURE: 128 MMHG | DIASTOLIC BLOOD PRESSURE: 68 MMHG | OXYGEN SATURATION: 98 % | BODY MASS INDEX: 26.76 KG/M2

## 2022-08-19 DIAGNOSIS — L03.116 CELLULITIS OF FOOT, LEFT: ICD-10-CM

## 2022-08-19 DIAGNOSIS — G62.9 NEUROPATHY: ICD-10-CM

## 2022-08-19 DIAGNOSIS — B07.9 VERRUCOUS LESION OF SKIN: Primary | ICD-10-CM

## 2022-08-19 PROCEDURE — 99213 OFFICE O/P EST LOW 20 MIN: CPT | Performed by: FAMILY MEDICINE

## 2022-08-19 PROCEDURE — 1123F ACP DISCUSS/DSCN MKR DOCD: CPT | Performed by: FAMILY MEDICINE

## 2022-08-19 NOTE — PROGRESS NOTES
[address] [facility] [address] [facility] [address]  Dept: [phone]    8/19/2022    CHIEF COMPLAINT    Chief Complaint   Patient presents with    Foot Injury       HPI    Bob Bedolla is a 74 y.o. female who presents   Chief Complaint   Patient presents with    Foot Injury   . Recheck skin lesion of left plantar aspect base of great toe. Lesion was red, swollen. Has used a topical antifungal on it. Started on keflex 3 days ago. She has finished ivermectin for scabies which has cleared. Photo prior to treatment 8/4/2022    Vitals:    08/19/22 0900   BP: 128/68   Pulse: 84   SpO2: 98%   Weight: 165 lb 12.8 oz (75.2 kg)       REVIEW OF SYSTEMS    Review of Systems   Constitutional:  Negative for fever. Skin:  Positive for wound. PAST MEDICAL HISTORY    Past Medical History:   Diagnosis Date    Allergic rhinitis     Anxiety     Back pain, chronic     Broken teeth     broken front teeth from intubation during general anesthesia [facility])    Congenital single kidney     Foreign body of thumb, left, infected 05/05/2013    Wooden sliver of left thumb (5/5/2013). Treated with antibiotics, responding well. Hepatitis A infection 1972 or 1973    history of sarthak this in college (from lab rats)    Hypertension     Mixed hyperlipidemia     Neuropathy     feet    Osteoarthritis     BACK    Post-operative nausea and vomiting     Status post dental restoration     front teeth replaced by dental implants (posts)    Wears contact lenses        FAMILY HISTORY    Family History   Problem Relation Age of Onset    High Blood Pressure Father     High Blood Pressure Sister        SOCIAL HISTORY    Social History     Socioeconomic History    Marital status:       Spouse name: None    Number of children: 3    Years of education: None    Highest education level: None   Tobacco Use    Smoking status: Never    Smokeless tobacco: Reason for Call:  Medication or medication refill: cephALEXin (KEFLEX) 500 MG capsule  Pt states her infection feels like it is coming back and the medication helped.  Patient is in [place] for the winter would like.     Do you use a [facility]?  Name of the pharmacy and phone number for the current request:  see pharmacy selected under medications    Name of the medication requested: cephALEXin (KEFLEX) 500 MG capsule    Last Written Prescription Date:  12/20/18  Last Fill Quantity: 28,  # refills: 0   Last office visit: 12/20/2018 with prescribing provider:  Philip   Future Office Visit:      Can we leave a detailed message on this number? YES    Phone number patient can be reached at: Other phone [phone]    Best Time: any    Call taken on 1/14/2019 at 10:32 AM by Michelle Perez         Never   Substance and Sexual Activity    Alcohol use: No     Comment: States that she used to drink socially 20 years ago, but gave it up. Drug use: No    Sexual activity: Not Currently     Social Determinants of Health     Financial Resource Strain: Low Risk     Difficulty of Paying Living Expenses: Not hard at all   Food Insecurity: No Food Insecurity    Worried About Running Out of Food in the Last Year: Never true    Ran Out of Food in the Last Year: Never true       SURGICAL HISTORY    Past Surgical History:   Procedure Laterality Date    APPENDECTOMY  2003    CHOLECYSTECTOMY      during hysterectomy    FOREIGN BODY REMOVAL Right 05/17/2013    hand    HYSTERECTOMY (CERVIX STATUS UNKNOWN)  2003    LUMBAR DISC SURGERY      x 3    NERVE BLOCK  11/19/2014    duramorph, decadron 10mg    TONSILLECTOMY      TUBAL LIGATION         CURRENT MEDICATIONS    Current Outpatient Medications   Medication Sig Dispense Refill    hydroCHLOROthiazide (HYDRODIURIL) 12.5 MG tablet TAKE ONE TABLET BY MOUTH DAILY 90 tablet 3    cephALEXin (KEFLEX) 500 MG capsule Take 1 capsule by mouth in the morning and 1 capsule at noon and 1 capsule before bedtime. Do all this for 7 days. 21 capsule 0    LORazepam (ATIVAN) 0.5 MG tablet TAKE ONE TABLET BY MOUTH EVERY 8 HOURS AS NEEDED FOR ANXIETY 90 tablet 0    oxyCODONE-acetaminophen (PERCOCET) 5-325 MG per tablet Take 1 tablet by mouth every 4 hours as needed for Pain for up to 30 days. 90 tablet 0    citalopram (CELEXA) 20 MG tablet TAKE ONE TABLET BY MOUTH DAILY 30 tablet 5     No current facility-administered medications for this visit. ALLERGIES    Allergies   Allergen Reactions    Bactrim [Sulfamethoxazole-Trimethoprim] Swelling     Facial swelling    Lisinopril Other (See Comments)     Cough, keeping her awake at night       PHYSICAL EXAM   Physical Exam  Constitutional:       Appearance: Normal appearance.    Skin:     Comments: See photo   Neurological:      Mental Status: She is alert.         ASSESSMENT/PLAN  1. Verrucous lesion of skin  Cont to keep clean and look at it daily. Soak in warm, soapy water. Finish antibiotic. Discussed seeing a podiatrist but she would like to wait and monitor. 2. Cellulitis of foot, left  Finish antibiotic. 3. Neuropathy  Look at feet daily. Bree Castro received counseling on the following healthy behaviors: medication adherence  Reviewed prior labs and health maintenance. Continue current medications, diet and exercise. Discussed use, benefit, and side effects of prescribed medications. Barriers to medication compliance addressed. Patient given educational materials - see patient instructions. All patient questions answered. Patient voiced understanding. Return if symptoms worsen or fail to improve.         Electronically signed by Galina Harp MD on 8/19/22 at 9:13 AM EDT

## 2022-09-06 DIAGNOSIS — M54.50 CHRONIC MIDLINE LOW BACK PAIN WITHOUT SCIATICA: ICD-10-CM

## 2022-09-06 DIAGNOSIS — G89.29 CHRONIC MIDLINE LOW BACK PAIN WITHOUT SCIATICA: ICD-10-CM

## 2022-09-06 DIAGNOSIS — F41.9 ANXIETY: ICD-10-CM

## 2022-09-06 RX ORDER — OXYCODONE HYDROCHLORIDE AND ACETAMINOPHEN 5; 325 MG/1; MG/1
1 TABLET ORAL EVERY 4 HOURS PRN
Qty: 90 TABLET | Refills: 0 | Status: SHIPPED | OUTPATIENT
Start: 2022-09-06 | End: 2022-10-05 | Stop reason: SDUPTHER

## 2022-09-06 RX ORDER — LORAZEPAM 0.5 MG/1
0.5 TABLET ORAL EVERY 8 HOURS PRN
Qty: 90 TABLET | Refills: 0 | Status: SHIPPED | OUTPATIENT
Start: 2022-09-06 | End: 2022-10-05 | Stop reason: SDUPTHER

## 2022-09-12 ENCOUNTER — OFFICE VISIT (OUTPATIENT)
Dept: PODIATRY | Age: 67
End: 2022-09-12
Payer: COMMERCIAL

## 2022-09-12 VITALS — BODY MASS INDEX: 26.52 KG/M2 | HEIGHT: 66 IN | WEIGHT: 165 LBS

## 2022-09-12 DIAGNOSIS — M54.41 CHRONIC BILATERAL LOW BACK PAIN WITH BILATERAL SCIATICA: ICD-10-CM

## 2022-09-12 DIAGNOSIS — R60.0 EDEMA, LOWER EXTREMITY: ICD-10-CM

## 2022-09-12 DIAGNOSIS — G54.1 IDIOPATHIC LUMBOSACRAL PLEXUS NEUROPATHY: ICD-10-CM

## 2022-09-12 DIAGNOSIS — M54.42 CHRONIC BILATERAL LOW BACK PAIN WITH BILATERAL SCIATICA: ICD-10-CM

## 2022-09-12 DIAGNOSIS — G89.29 CHRONIC BILATERAL LOW BACK PAIN WITH BILATERAL SCIATICA: ICD-10-CM

## 2022-09-12 DIAGNOSIS — L97.522 ULCER OF LEFT FOOT WITH FAT LAYER EXPOSED (HCC): Primary | ICD-10-CM

## 2022-09-12 PROCEDURE — 99203 OFFICE O/P NEW LOW 30 MIN: CPT | Performed by: PODIATRIST

## 2022-09-12 PROCEDURE — 11042 DBRDMT SUBQ TIS 1ST 20SQCM/<: CPT | Performed by: PODIATRIST

## 2022-09-12 PROCEDURE — 1123F ACP DISCUSS/DSCN MKR DOCD: CPT | Performed by: PODIATRIST

## 2022-09-12 RX ORDER — IVERMECTIN 3 MG/1
TABLET ORAL
COMMUNITY
Start: 2022-08-04 | End: 2022-09-12 | Stop reason: ALTCHOICE

## 2022-09-12 ASSESSMENT — ENCOUNTER SYMPTOMS
NAUSEA: 0
SHORTNESS OF BREATH: 0
BACK PAIN: 0
DIARRHEA: 0
COLOR CHANGE: 0

## 2022-09-12 NOTE — PROGRESS NOTES
Bradford Sykes is a 74 y.o. female who presents to the office today with chief complaint of wound to the left foot. Chief Complaint   Patient presents with    Wound Check     Possible wound left foot that patient noticed 8/6/22   Symptoms began about 1 month(s) ago. Patient denies injury to the left foot. Patient states that she has neuropathy secondary to lower back issues and she noticed an open wound on the left foot. Patient states that the area has a scab now, but she has some swelling to the area. Pain is rated 0 out of 10 at it's worst and is described as none. Treatments prior to today's visit include: None. Allergies   Allergen Reactions    Bactrim [Sulfamethoxazole-Trimethoprim] Swelling     Facial swelling    Lisinopril Other (See Comments)     Cough, keeping her awake at night       Past Medical History:   Diagnosis Date    Allergic rhinitis     Anxiety     Back pain, chronic     Broken teeth     broken front teeth from intubation during general anesthesia [facility])    Congenital single kidney     Foreign body of thumb, left, infected 05/05/2013    Wooden sliver of left thumb (5/5/2013). Treated with antibiotics, responding well. Hepatitis A infection 1972 or 1973    history of sarthak this in college (from lab rats)    Hypertension     Mixed hyperlipidemia     Neuropathy     feet    Osteoarthritis     BACK    Post-operative nausea and vomiting     Status post dental restoration     front teeth replaced by dental implants (posts)    Wears contact lenses        Prior to Admission medications    Medication Sig Start Date End Date Taking? Authorizing Provider   LORazepam (ATIVAN) 0.5 MG tablet Take 1 tablet by mouth every 8 hours as needed for Anxiety for up to 30 days. 9/6/22 10/6/22 Yes Madhu William MD   oxyCODONE-acetaminophen (PERCOCET) 5-325 MG per tablet Take 1 tablet by mouth every 4 hours as needed for Pain for up to 30 days.  9/6/22 10/6/22 Yes Madhu William MD hydroCHLOROthiazide (HYDRODIURIL) 12.5 MG tablet TAKE ONE TABLET BY MOUTH DAILY 8/16/22  Yes Gerard Ho MD   citalopram (CELEXA) 20 MG tablet TAKE ONE TABLET BY MOUTH DAILY 3/29/22  Yes Gerard Ho MD       Past Surgical History:   Procedure Laterality Date    APPENDECTOMY  2003    CHOLECYSTECTOMY      during hysterectomy    FOREIGN BODY REMOVAL Right 05/17/2013    hand    HYSTERECTOMY (CERVIX STATUS UNKNOWN)  2003    LUMBAR DISC SURGERY      x 3    NERVE BLOCK  11/19/2014    duramorph, decadron 10mg    TONSILLECTOMY      TUBAL LIGATION         Family History   Problem Relation Age of Onset    High Blood Pressure Father     High Blood Pressure Sister        Social History     Tobacco Use    Smoking status: Never    Smokeless tobacco: Never   Substance Use Topics    Alcohol use: No     Comment: States that she used to drink socially 20 years ago, but gave it up. Review of Systems   Constitutional:  Negative for activity change, appetite change, chills, diaphoresis, fatigue and fever. Respiratory:  Negative for shortness of breath. Cardiovascular:  Negative for leg swelling. Gastrointestinal:  Negative for diarrhea and nausea. Endocrine: Negative for cold intolerance, heat intolerance and polyuria. Musculoskeletal:  Positive for arthralgias. Negative for back pain, gait problem, joint swelling and myalgias. Skin:  Positive for wound. Negative for color change, pallor and rash. Allergic/Immunologic: Negative for environmental allergies and food allergies. Neurological:  Negative for dizziness, weakness, light-headedness and numbness. Hematological:  Does not bruise/bleed easily. Psychiatric/Behavioral:  Negative for behavioral problems, confusion and self-injury. The patient is not nervous/anxious. Vitals: There were no vitals filed for this visit. General: AAO x 3 in NAD.      Integument: There hyperkeratotic tissue noted to the left foot to the plantar medial aspect of the 5.07 Alabaster-Juliocesar monofilament. Protective sensation is absent to the left plantar foot as noted with a 5.07 Alabaster-Juliocesar monofilament. Musculoskeletal:  Muscle strength is +5/5 to all four muscle groups of the right lower extremity and +5/5 to all four muscle groups of the left lower extremity. There are no areas of subluxation, dislocation, or laxity noted to either lower extremity. Range of motion to the right ankle is  free of pain or grinding. Range of motion to the left ankle is  free of pain or grinding. Range of motion to the right subtalar joint is  free of pain or grinding. Range of motion to the left subtalar joint is  free of pain or grinding. No abnormalities, asymmetries, or misalignments are seen between the extremities. Weightbearing evaluation does not reveal rearfoot eversion, medial prominence of the talar head, loss of the medial longitudinal arch height, and too many toes sign bilaterally. The lesser digits of the right foot are contracted. The lesser digits of the left foot are contracted. There is no prominence noted to the first metatarsal head without abduction of the hallux of the right foot. There is no prominence noted to the first metatarsal head without abduction of the hallux of the left foot. Shoe examination was performed. Biomechanical Exam: normal bilaterally. Asessment: Patient is a 74 y.o. female with:    Diagnosis Orders   1. Ulcer of left foot with fat layer exposed ([address]  MA DEBRIDEMENT, SKIN, SUB-Q TISSUE,=<20 SQ CM      2. Edema, lower extremity  MA DEBRIDEMENT, SKIN, SUB-Q TISSUE,=<20 SQ CM      3. Chronic bilateral low back pain with bilateral sciatica  MA DEBRIDEMENT, SKIN, SUB-Q TISSUE,=<20 SQ CM      4. Idiopathic lumbosacral plexus neuropathy  MA DEBRIDEMENT, SKIN, SUB-Q TISSUE,=<20 SQ CM          Plan:  1. Clinical evaluation of the patient.  2. Following excisional debridement of 100% of the wound of the left foot to the depth of the subcutaneous tissue with a fifteen blade the area was dressed with antibiotic ointment and a band aid. Patient to change this dressing daily. 3. Contact office with any questions/problems/concerns. Return in about 1 week (around 9/19/2022) for Wound Care.    9/12/2022      Ramo Garcia DPM

## 2022-09-19 ENCOUNTER — OFFICE VISIT (OUTPATIENT)
Dept: PODIATRY | Age: 67
End: 2022-09-19
Payer: COMMERCIAL

## 2022-09-19 VITALS — BODY MASS INDEX: 26.52 KG/M2 | HEIGHT: 66 IN | WEIGHT: 165 LBS

## 2022-09-19 DIAGNOSIS — L97.522 ULCER OF LEFT FOOT WITH FAT LAYER EXPOSED (HCC): Primary | ICD-10-CM

## 2022-09-19 DIAGNOSIS — M79.672 PAIN IN LEFT FOOT: ICD-10-CM

## 2022-09-19 DIAGNOSIS — R60.0 EDEMA, LOWER EXTREMITY: ICD-10-CM

## 2022-09-19 PROCEDURE — 99213 OFFICE O/P EST LOW 20 MIN: CPT | Performed by: PODIATRIST

## 2022-09-19 PROCEDURE — 1123F ACP DISCUSS/DSCN MKR DOCD: CPT | Performed by: PODIATRIST

## 2022-09-19 ASSESSMENT — ENCOUNTER SYMPTOMS
BACK PAIN: 0
NAUSEA: 0
DIARRHEA: 0
COLOR CHANGE: 0
SHORTNESS OF BREATH: 0

## 2022-09-19 NOTE — PROGRESS NOTES
[address] [place] [address] Podiatry  Return Patient Progress Note    Subjective: Zohaib Daly 74 y.o. female that presents for follow up evaluation of wound to the left foot. Chief Complaint   Patient presents with    Wound Check     Left foot     Patient's treatment thus far has included daily dressing changes with antibiotic ointment and a band aid. Pain is rated 0 out of 10 and is described as none. Patient has been following my prescribed course of therapy as instructed. Patient states that she thinks the wound has healed. Review of Systems   Constitutional:  Negative for activity change, appetite change, chills, diaphoresis, fatigue and fever. Respiratory:  Negative for shortness of breath. Cardiovascular:  Negative for leg swelling. Gastrointestinal:  Negative for diarrhea and nausea. Endocrine: Negative for cold intolerance, heat intolerance and polyuria. Musculoskeletal:  Positive for arthralgias. Negative for back pain, gait problem, joint swelling and myalgias. Skin:  Negative for color change, pallor, rash and wound. Allergic/Immunologic: Negative for environmental allergies and food allergies. Neurological:  Negative for dizziness, weakness, light-headedness and numbness. Hematological:  Does not bruise/bleed easily. Psychiatric/Behavioral:  Negative for behavioral problems, confusion and self-injury. The patient is not nervous/anxious. Objective: Clinical evaluation of the patient reveals a thin layer of hyperkeratotic tissue formation to the plantar medial aspect of the left first metatarsal head. There is no remaining wound noted to this area of the left foot. There is no erythema, calor, or edema noted to the left foot. There is no pain with palpation to this area of the left foot. Assessment:    Diagnosis Orders   1. Ulcer of left foot with fat layer exposed ([address]        2. Edema, lower extremity        3. Pain in left foot              Plan: 1.  Clinical evaluation of the patient. 2. Patient informed that she has adequately healed and may discontinue dressing changes to the left foot. Patient to return to the office in three weeks to take down the callous to the left foot.  3. Return in about 3 weeks (around 10/10/2022) for Painful callous(es).   9/19/2022      Jose Rao DPM

## 2022-09-24 DIAGNOSIS — F32.9 REACTIVE DEPRESSION: ICD-10-CM

## 2022-09-26 RX ORDER — CITALOPRAM 20 MG/1
TABLET ORAL
Qty: 30 TABLET | Refills: 5 | Status: SHIPPED | OUTPATIENT
Start: 2022-09-26

## 2022-10-05 DIAGNOSIS — G89.29 CHRONIC MIDLINE LOW BACK PAIN WITHOUT SCIATICA: ICD-10-CM

## 2022-10-05 DIAGNOSIS — F41.9 ANXIETY: ICD-10-CM

## 2022-10-05 DIAGNOSIS — M54.50 CHRONIC MIDLINE LOW BACK PAIN WITHOUT SCIATICA: ICD-10-CM

## 2022-10-05 RX ORDER — OXYCODONE HYDROCHLORIDE AND ACETAMINOPHEN 5; 325 MG/1; MG/1
1 TABLET ORAL EVERY 4 HOURS PRN
Qty: 90 TABLET | Refills: 0 | Status: SHIPPED | OUTPATIENT
Start: 2022-10-05 | End: 2022-11-04 | Stop reason: SDUPTHER

## 2022-10-05 RX ORDER — LORAZEPAM 0.5 MG/1
0.5 TABLET ORAL EVERY 8 HOURS PRN
Qty: 90 TABLET | Refills: 0 | Status: SHIPPED | OUTPATIENT
Start: 2022-10-05 | End: 2022-11-04 | Stop reason: SDUPTHER

## 2022-10-05 NOTE — TELEPHONE ENCOUNTER
Kennedi Randall is calling to request a refill on the following medication(s):    Medication Request:  Requested Prescriptions     Pending Prescriptions Disp Refills    LORazepam (ATIVAN) 0.5 MG tablet 90 tablet 0     Sig: Take 1 tablet by mouth every 8 hours as needed for Anxiety for up to 30 days. oxyCODONE-acetaminophen (PERCOCET) 5-325 MG per tablet 90 tablet 0     Sig: Take 1 tablet by mouth every 4 hours as needed for Pain for up to 30 days.        Last Visit Date (If Applicable):  4/62/6150    Next Visit Date:    10/10/2022

## 2022-10-10 ENCOUNTER — OFFICE VISIT (OUTPATIENT)
Dept: FAMILY MEDICINE CLINIC | Age: 67
End: 2022-10-10
Payer: COMMERCIAL

## 2022-10-10 ENCOUNTER — OFFICE VISIT (OUTPATIENT)
Dept: PODIATRY | Age: 67
End: 2022-10-10
Payer: COMMERCIAL

## 2022-10-10 VITALS
HEART RATE: 76 BPM | DIASTOLIC BLOOD PRESSURE: 75 MMHG | TEMPERATURE: 98.4 F | BODY MASS INDEX: 26.92 KG/M2 | SYSTOLIC BLOOD PRESSURE: 126 MMHG | OXYGEN SATURATION: 98 % | WEIGHT: 166.8 LBS

## 2022-10-10 VITALS — HEIGHT: 66 IN | BODY MASS INDEX: 26.52 KG/M2 | WEIGHT: 165 LBS

## 2022-10-10 DIAGNOSIS — K04.7 CHRONIC DENTAL INFECTION: ICD-10-CM

## 2022-10-10 DIAGNOSIS — L97.522 ULCER OF LEFT FOOT WITH FAT LAYER EXPOSED (HCC): Primary | ICD-10-CM

## 2022-10-10 DIAGNOSIS — I10 ESSENTIAL HYPERTENSION: ICD-10-CM

## 2022-10-10 DIAGNOSIS — M79.672 PAIN IN LEFT FOOT: ICD-10-CM

## 2022-10-10 DIAGNOSIS — G89.29 CHRONIC MIDLINE LOW BACK PAIN WITHOUT SCIATICA: Primary | ICD-10-CM

## 2022-10-10 DIAGNOSIS — M54.50 CHRONIC MIDLINE LOW BACK PAIN WITHOUT SCIATICA: Primary | ICD-10-CM

## 2022-10-10 DIAGNOSIS — Z13.1 SCREENING FOR DIABETES MELLITUS: ICD-10-CM

## 2022-10-10 LAB — HBA1C MFR BLD: 5.8 %

## 2022-10-10 PROCEDURE — 1123F ACP DISCUSS/DSCN MKR DOCD: CPT | Performed by: PODIATRIST

## 2022-10-10 PROCEDURE — 83036 HEMOGLOBIN GLYCOSYLATED A1C: CPT | Performed by: FAMILY MEDICINE

## 2022-10-10 PROCEDURE — 99213 OFFICE O/P EST LOW 20 MIN: CPT | Performed by: PODIATRIST

## 2022-10-10 PROCEDURE — 99214 OFFICE O/P EST MOD 30 MIN: CPT | Performed by: FAMILY MEDICINE

## 2022-10-10 PROCEDURE — 1123F ACP DISCUSS/DSCN MKR DOCD: CPT | Performed by: FAMILY MEDICINE

## 2022-10-10 RX ORDER — DOXYCYCLINE HYCLATE 100 MG
100 TABLET ORAL 2 TIMES DAILY
Qty: 60 TABLET | Refills: 0 | Status: SHIPPED | OUTPATIENT
Start: 2022-10-10 | End: 2022-11-09

## 2022-10-10 ASSESSMENT — ENCOUNTER SYMPTOMS
DIARRHEA: 0
CONSTIPATION: 0
COLOR CHANGE: 0
NAUSEA: 0
BACK PAIN: 1
BACK PAIN: 0
COUGH: 0
ALLERGIC/IMMUNOLOGIC NEGATIVE: 1
ABDOMINAL PAIN: 0
SHORTNESS OF BREATH: 0
EYES NEGATIVE: 1
BLOOD IN STOOL: 0
DIARRHEA: 0
SHORTNESS OF BREATH: 0

## 2022-10-10 ASSESSMENT — PATIENT HEALTH QUESTIONNAIRE - PHQ9
2. FEELING DOWN, DEPRESSED OR HOPELESS: 0
5. POOR APPETITE OR OVEREATING: 0
3. TROUBLE FALLING OR STAYING ASLEEP: 0
SUM OF ALL RESPONSES TO PHQ9 QUESTIONS 1 & 2: 0
4. FEELING TIRED OR HAVING LITTLE ENERGY: 0
SUM OF ALL RESPONSES TO PHQ QUESTIONS 1-9: 0
SUM OF ALL RESPONSES TO PHQ QUESTIONS 1-9: 0
9. THOUGHTS THAT YOU WOULD BE BETTER OFF DEAD, OR OF HURTING YOURSELF: 0
SUM OF ALL RESPONSES TO PHQ QUESTIONS 1-9: 0
7. TROUBLE CONCENTRATING ON THINGS, SUCH AS READING THE NEWSPAPER OR WATCHING TELEVISION: 0
SUM OF ALL RESPONSES TO PHQ QUESTIONS 1-9: 0
1. LITTLE INTEREST OR PLEASURE IN DOING THINGS: 0
8. MOVING OR SPEAKING SO SLOWLY THAT OTHER PEOPLE COULD HAVE NOTICED. OR THE OPPOSITE, BEING SO FIGETY OR RESTLESS THAT YOU HAVE BEEN MOVING AROUND A LOT MORE THAN USUAL: 0
10. IF YOU CHECKED OFF ANY PROBLEMS, HOW DIFFICULT HAVE THESE PROBLEMS MADE IT FOR YOU TO DO YOUR WORK, TAKE CARE OF THINGS AT HOME, OR GET ALONG WITH OTHER PEOPLE: 0
6. FEELING BAD ABOUT YOURSELF - OR THAT YOU ARE A FAILURE OR HAVE LET YOURSELF OR YOUR FAMILY DOWN: 0

## 2022-10-10 NOTE — PROGRESS NOTES
[address] [place] [address] Podiatry  Return Patient Progress Note    Subjective: Avelino Padilla 69 y.o. female that presents for follow up evaluation of wound to the left foot. Chief Complaint   Patient presents with    Wound Check     Left callous     Patient's treatment thus far has included cessation of dressing changes. Pain is rated 0 out of 10 and is described as none as the patient is neuropathic. Patient has been following my prescribed course of therapy as instructed. Review of Systems   Constitutional:  Negative for activity change, appetite change, chills, diaphoresis, fatigue and fever. Respiratory:  Negative for shortness of breath. Cardiovascular:  Negative for leg swelling. Gastrointestinal:  Negative for diarrhea and nausea. Endocrine: Negative for cold intolerance, heat intolerance and polyuria. Musculoskeletal:  Positive for arthralgias. Negative for back pain, gait problem, joint swelling and myalgias. Skin:  Negative for color change, pallor, rash and wound. Allergic/Immunologic: Negative for environmental allergies and food allergies. Neurological:  Negative for dizziness, weakness, light-headedness and numbness. Hematological:  Does not bruise/bleed easily. Psychiatric/Behavioral:  Negative for behavioral problems, confusion and self-injury. The patient is not nervous/anxious. Objective: Clinical evaluation of the patient reveals thin layer of hyperkeratotic tissue formation to the plantar medial aspect of the left first metatarsal head. Debridement of this tissue with a fifteen blade does not reveal any remaining wound to this area. There is no erythema, calor, or edema noted to the left foot. There is no pain with palpation to this area of the left foot. Assessment:    Diagnosis Orders   1. Ulcer of left foot with fat layer exposed ([address]        2. Pain in left foot              Plan: 1. Clinical evaluation of the patient.  2. The hyperkeratotic tissue to the left foot was taken down with a fifteen blade. The patient was instructed to inspect her feet daily for any wounds developing. 3. Return if symptoms worsen or fail to improve.    10/10/2022      Rocky Lieberman DPM

## 2022-10-10 NOTE — PROGRESS NOTES
[address] [facility] [address] [facility] [address]  Dept: [phone]    10/10/2022    CHIEF COMPLAINT    Chief Complaint   Patient presents with    Lower Back Pain       HPI    Scot Plan is a 69 y.o. female who presents   Chief Complaint   Patient presents with    Lower Back Pain   . Recheck chronic conditions including anxiety, chronic back pain, htn and depression. Taking meds as prescribed. Taking percocet three times daily along with occasional aspirin. Has been to pain management and pain block was not effective due to scar tissue. Taking ativan as needed when feeling anxious. Takes celexa daily. Vitals:    10/10/22 1011   BP: 126/75   Pulse: 76   Temp: 98.4 °F (36.9 °C)   SpO2: 98%   Weight: 166 lb 12.8 oz (75.7 kg)       REVIEW OF SYSTEMS    Review of Systems   Constitutional:  Negative for fatigue, fever and unexpected weight change. HENT: Negative. Eyes: Negative. Respiratory:  Negative for cough and shortness of breath. Cardiovascular:  Negative for chest pain and leg swelling. Gastrointestinal:  Negative for abdominal pain, blood in stool, constipation and diarrhea. Endocrine: Negative. Genitourinary:  Negative for frequency and urgency. Musculoskeletal:  Positive for back pain. Skin: Negative. Allergic/Immunologic: Negative. Neurological:  Negative for dizziness and headaches. Hematological: Negative. Psychiatric/Behavioral:  Negative for sleep disturbance. The patient is not nervous/anxious. PAST MEDICAL HISTORY    Past Medical History:   Diagnosis Date    Allergic rhinitis     Anxiety     Back pain, chronic     Broken teeth     broken front teeth from intubation during general anesthesia [facility])    Congenital single kidney     Foreign body of thumb, left, infected 05/05/2013    Wooden sliver of left thumb (5/5/2013). Treated with antibiotics, responding well. Hepatitis A infection 1972 or 1973    history of sarthak this in college (from lab rats)    Hypertension     Mixed hyperlipidemia     Neuropathy     feet    Osteoarthritis     BACK    Post-operative nausea and vomiting     Status post dental restoration     front teeth replaced by dental implants (posts)    Wears contact lenses        FAMILY HISTORY    Family History   Problem Relation Age of Onset    High Blood Pressure Father     High Blood Pressure Sister        SOCIAL HISTORY    Social History     Socioeconomic History    Marital status:      Spouse name: None    Number of children: 3    Years of education: None    Highest education level: None   Tobacco Use    Smoking status: Never    Smokeless tobacco: Never   Substance and Sexual Activity    Alcohol use: No     Comment: States that she used to drink socially 20 years ago, but gave it up. Drug use: No    Sexual activity: Not Currently     Social Determinants of Health     Financial Resource Strain: Low Risk     Difficulty of Paying Living Expenses: Not hard at all   Food Insecurity: No Food Insecurity    Worried About [facility] in the Last Year: Never true    Ran Out of Food in the Last Year: Never true   Physical Activity: Unknown    Days of Exercise per Week: 0 days       SURGICAL HISTORY    Past Surgical History:   Procedure Laterality Date    APPENDECTOMY  2003    CHOLECYSTECTOMY      during hysterectomy    FOREIGN BODY REMOVAL Right 05/17/2013    hand    HYSTERECTOMY (CERVIX STATUS UNKNOWN)  2003    LUMBAR DISC SURGERY      x 3    NERVE BLOCK  11/19/2014    duramorph, decadron 10mg    TONSILLECTOMY      TUBAL LIGATION         CURRENT MEDICATIONS    Current Outpatient Medications   Medication Sig Dispense Refill    doxycycline hyclate (VIBRA-TABS) 100 MG tablet Take 1 tablet by mouth 2 times daily 60 tablet 0    LORazepam (ATIVAN) 0.5 MG tablet Take 1 tablet by mouth every 8 hours as needed for Anxiety for up to 30 days.  90 tablet 0 oxyCODONE-acetaminophen (PERCOCET) 5-325 MG per tablet Take 1 tablet by mouth every 4 hours as needed for Pain for up to 30 days. 90 tablet 0    citalopram (CELEXA) 20 MG tablet TAKE ONE TABLET BY MOUTH DAILY 30 tablet 5    hydroCHLOROthiazide (HYDRODIURIL) 12.5 MG tablet TAKE ONE TABLET BY MOUTH DAILY 90 tablet 3     No current facility-administered medications for this visit. ALLERGIES    Allergies   Allergen Reactions    Bactrim [Sulfamethoxazole-Trimethoprim] Swelling     Facial swelling    Lisinopril Other (See Comments)     Cough, keeping her awake at night       PHYSICAL EXAM   Physical Exam  Vitals reviewed. Constitutional:       Appearance: She is well-developed. HENT:      Head: Normocephalic. Eyes:      Pupils: Pupils are equal, round, and reactive to light. Neck:      Thyroid: No thyromegaly. Cardiovascular:      Rate and Rhythm: Normal rate and regular rhythm. Heart sounds: Normal heart sounds. No murmur heard. Pulmonary:      Effort: Pulmonary effort is normal.      Breath sounds: Normal breath sounds. No wheezing or rales. Abdominal:      Palpations: Abdomen is soft. Tenderness: There is no abdominal tenderness. There is no guarding or rebound. Musculoskeletal:         General: Tenderness (low back) present. No deformity. Normal range of motion. Cervical back: Normal range of motion and neck supple. Lymphadenopathy:      Cervical: No cervical adenopathy. Skin:     General: Skin is warm and dry. Neurological:      Mental Status: She is alert and oriented to person, place, and time. Psychiatric:         Mood and Affect: Mood normal.         Behavior: Behavior normal.         Thought Content: Thought content normal.         Judgment: Judgment normal.       ASSESSMENT/PLAN  1.  Chronic midline low back pain without sciatica  Controlled substances monitoring: possible medication side effects, risk of tolerance and/or dependence, and alternative treatments discussed, no signs of potential drug abuse or diversion identified, OARRS report reviewed today- activity consistent with treatment plan, medication contract signed today, and random urine drug screen sent today. 2. Essential hypertension  Cont current dose and monitor at home    3. Screening for diabetes mellitus  Results for orders placed or performed in visit on 10/10/22   POCT glycosylated hemoglobin (Hb A1C)   Result Value Ref Range    Hemoglobin A1C 5.8 %     Discussed that she is borderline pre-diabetic, decrease carbs  - POCT glycosylated hemoglobin (Hb A1C)    4. Chronic dental infection  Use med if needed. - doxycycline hyclate (VIBRA-TABS) 100 MG tablet; Take 1 tablet by mouth 2 times daily  Dispense: 60 tablet; Refill: 0       Donna received counseling on the following healthy behaviors: nutrition, exercise, and medication adherence  Reviewed prior labs and health maintenance. Continue current medications, diet and exercise. Discussed use, benefit, and side effects of prescribed medications. Barriers to medication compliance addressed. Patient given educational materials - see patient instructions. All patient questions answered. Patient voiced understanding. Return in about 3 months (around 1/10/2023) for chronic pain, htn.         Electronically signed by Cheyenne Felipe MD on 10/10/22 at 10:23 AM EDT

## 2022-11-04 DIAGNOSIS — M54.50 CHRONIC MIDLINE LOW BACK PAIN WITHOUT SCIATICA: ICD-10-CM

## 2022-11-04 DIAGNOSIS — F41.9 ANXIETY: ICD-10-CM

## 2022-11-04 DIAGNOSIS — G89.29 CHRONIC MIDLINE LOW BACK PAIN WITHOUT SCIATICA: ICD-10-CM

## 2022-11-04 RX ORDER — LORAZEPAM 0.5 MG/1
0.5 TABLET ORAL EVERY 8 HOURS PRN
Qty: 90 TABLET | Refills: 0 | Status: SHIPPED | OUTPATIENT
Start: 2022-11-04 | End: 2022-12-04

## 2022-11-04 RX ORDER — OXYCODONE HYDROCHLORIDE AND ACETAMINOPHEN 5; 325 MG/1; MG/1
1 TABLET ORAL EVERY 4 HOURS PRN
Qty: 90 TABLET | Refills: 0 | Status: SHIPPED | OUTPATIENT
Start: 2022-11-04 | End: 2022-12-04

## 2022-11-04 NOTE — TELEPHONE ENCOUNTER
Lyle Lundy is calling to request a refill on the following medication(s):    Last Visit Date (If Applicable):  09/46/4959    Next Visit Date:    1/9/2023    Medication Request:  Requested Prescriptions     Pending Prescriptions Disp Refills    LORazepam (ATIVAN) 0.5 MG tablet 90 tablet 0     Sig: Take 1 tablet by mouth every 8 hours as needed for Anxiety for up to 30 days. oxyCODONE-acetaminophen (PERCOCET) 5-325 MG per tablet 90 tablet 0     Sig: Take 1 tablet by mouth every 4 hours as needed for Pain for up to 30 days.

## 2022-12-02 DIAGNOSIS — M54.50 CHRONIC MIDLINE LOW BACK PAIN WITHOUT SCIATICA: ICD-10-CM

## 2022-12-02 DIAGNOSIS — G89.29 CHRONIC MIDLINE LOW BACK PAIN WITHOUT SCIATICA: ICD-10-CM

## 2022-12-02 DIAGNOSIS — F41.9 ANXIETY: ICD-10-CM

## 2022-12-02 RX ORDER — OXYCODONE HYDROCHLORIDE AND ACETAMINOPHEN 5; 325 MG/1; MG/1
1 TABLET ORAL EVERY 4 HOURS PRN
Qty: 90 TABLET | Refills: 0 | Status: SHIPPED | OUTPATIENT
Start: 2022-12-02 | End: 2023-01-01

## 2022-12-02 RX ORDER — LORAZEPAM 0.5 MG/1
0.5 TABLET ORAL EVERY 8 HOURS PRN
Qty: 90 TABLET | Refills: 0 | Status: SHIPPED | OUTPATIENT
Start: 2022-12-02 | End: 2023-01-01

## 2022-12-02 NOTE — TELEPHONE ENCOUNTER
Patient is requesting a med refill on pended medications. Patient pharmacy is current in chart, patient has future appt scheduled with provider in January.

## 2022-12-20 ENCOUNTER — TELEPHONE (OUTPATIENT)
Dept: FAMILY MEDICINE CLINIC | Age: 67
End: 2022-12-20

## 2022-12-20 DIAGNOSIS — R05.1 ACUTE COUGH: Primary | ICD-10-CM

## 2022-12-20 RX ORDER — BENZONATATE 200 MG/1
200 CAPSULE ORAL 3 TIMES DAILY PRN
Qty: 30 CAPSULE | Refills: 1 | Status: SHIPPED | OUTPATIENT
Start: 2022-12-20

## 2022-12-20 NOTE — TELEPHONE ENCOUNTER
Patient called in stating that she took a covid test and it was negative.       [facility] [id number] Stephy Lilly, [address] [facility] [phone] - [phone]          Please advise

## 2022-12-20 NOTE — TELEPHONE ENCOUNTER
Patient called in stating that she started having the following sx since Friday     She has not been tested for covid but she will take a test and call  us back      Cough   Body aches   Sore throat   Chills   Cant sleep    Please advise

## 2023-01-06 DIAGNOSIS — G89.29 CHRONIC MIDLINE LOW BACK PAIN WITHOUT SCIATICA: ICD-10-CM

## 2023-01-06 DIAGNOSIS — M54.50 CHRONIC MIDLINE LOW BACK PAIN WITHOUT SCIATICA: ICD-10-CM

## 2023-01-06 DIAGNOSIS — F41.9 ANXIETY: ICD-10-CM

## 2023-01-06 RX ORDER — OXYCODONE HYDROCHLORIDE AND ACETAMINOPHEN 5; 325 MG/1; MG/1
1 TABLET ORAL EVERY 4 HOURS PRN
Qty: 90 TABLET | Refills: 0 | Status: SHIPPED | OUTPATIENT
Start: 2023-01-06 | End: 2023-02-05

## 2023-01-06 RX ORDER — LORAZEPAM 0.5 MG/1
0.5 TABLET ORAL EVERY 8 HOURS PRN
Qty: 90 TABLET | Refills: 0 | Status: SHIPPED | OUTPATIENT
Start: 2023-01-06 | End: 2023-02-05

## 2023-01-06 NOTE — TELEPHONE ENCOUNTER
Medication pending. Current Outpatient Medications on File Prior to Visit   Medication Sig Dispense Refill    benzonatate (TESSALON) 200 MG capsule Take 1 capsule by mouth 3 times daily as needed for Cough 30 capsule 1    citalopram (CELEXA) 20 MG tablet TAKE ONE TABLET BY MOUTH DAILY 30 tablet 5    hydroCHLOROthiazide (HYDRODIURIL) 12.5 MG tablet TAKE ONE TABLET BY MOUTH DAILY 90 tablet 3     No current facility-administered medications on file prior to visit.

## 2023-01-09 ENCOUNTER — HOSPITAL ENCOUNTER (OUTPATIENT)
Age: 68
Setting detail: SPECIMEN
Discharge: HOME | End: 2023-01-09

## 2023-01-09 ENCOUNTER — OFFICE VISIT (OUTPATIENT)
Dept: FAMILY MEDICINE CLINIC | Age: 68
End: 2023-01-09
Payer: COMMERCIAL

## 2023-01-09 VITALS
WEIGHT: 166.2 LBS | HEIGHT: 66 IN | SYSTOLIC BLOOD PRESSURE: 110 MMHG | BODY MASS INDEX: 26.71 KG/M2 | DIASTOLIC BLOOD PRESSURE: 62 MMHG | HEART RATE: 78 BPM

## 2023-01-09 DIAGNOSIS — N28.9 RENAL INSUFFICIENCY: ICD-10-CM

## 2023-01-09 DIAGNOSIS — M54.50 CHRONIC MIDLINE LOW BACK PAIN WITHOUT SCIATICA: Primary | ICD-10-CM

## 2023-01-09 DIAGNOSIS — K76.0 NAFL (NONALCOHOLIC FATTY LIVER): ICD-10-CM

## 2023-01-09 DIAGNOSIS — G89.29 CHRONIC MIDLINE LOW BACK PAIN WITHOUT SCIATICA: Primary | ICD-10-CM

## 2023-01-09 LAB
ALBUMIN SERPL-MCNC: 4.4 G/DL (ref 3.5–5.2)
ALBUMIN/GLOBULIN RATIO: 1.3 (ref 1–2.5)
ALP BLD-CCNC: 73 U/L (ref 35–104)
ALT SERPL-CCNC: 21 U/L (ref 5–33)
ANION GAP SERPL CALCULATED.3IONS-SCNC: 13 MMOL/L (ref 9–17)
AST SERPL-CCNC: 31 U/L
BILIRUB SERPL-MCNC: 1.1 MG/DL (ref 0.3–1.2)
BUN BLDV-MCNC: 21 MG/DL (ref 8–23)
CALCIUM SERPL-MCNC: 9.9 MG/DL (ref 8.6–10.4)
CHLORIDE BLD-SCNC: 96 MMOL/L (ref 98–107)
CO2: 29 MMOL/L (ref 20–31)
CREAT SERPL-MCNC: 1.12 MG/DL (ref 0.5–0.9)
GFR SERPL CREATININE-BSD FRML MDRD: 53 ML/MIN/1.73M2
GLUCOSE BLD-MCNC: 89 MG/DL (ref 70–99)
POTASSIUM SERPL-SCNC: 3.8 MMOL/L (ref 3.7–5.3)
SODIUM BLD-SCNC: 138 MMOL/L (ref 135–144)
TOTAL PROTEIN: 7.9 G/DL (ref 6.4–8.3)

## 2023-01-09 PROCEDURE — 3078F DIAST BP <80 MM HG: CPT | Performed by: FAMILY MEDICINE

## 2023-01-09 PROCEDURE — 99214 OFFICE O/P EST MOD 30 MIN: CPT | Performed by: FAMILY MEDICINE

## 2023-01-09 PROCEDURE — 1123F ACP DISCUSS/DSCN MKR DOCD: CPT | Performed by: FAMILY MEDICINE

## 2023-01-09 PROCEDURE — 3074F SYST BP LT 130 MM HG: CPT | Performed by: FAMILY MEDICINE

## 2023-01-09 RX ORDER — OXYCODONE HYDROCHLORIDE 5 MG/1
5 TABLET ORAL EVERY 8 HOURS PRN
Qty: 90 TABLET | Refills: 0
Start: 2023-01-09 | End: 2023-02-08

## 2023-01-09 ASSESSMENT — PATIENT HEALTH QUESTIONNAIRE - PHQ9
SUM OF ALL RESPONSES TO PHQ QUESTIONS 1-9: 4
10. IF YOU CHECKED OFF ANY PROBLEMS, HOW DIFFICULT HAVE THESE PROBLEMS MADE IT FOR YOU TO DO YOUR WORK, TAKE CARE OF THINGS AT HOME, OR GET ALONG WITH OTHER PEOPLE: 0
1. LITTLE INTEREST OR PLEASURE IN DOING THINGS: 0
7. TROUBLE CONCENTRATING ON THINGS, SUCH AS READING THE NEWSPAPER OR WATCHING TELEVISION: 0
6. FEELING BAD ABOUT YOURSELF - OR THAT YOU ARE A FAILURE OR HAVE LET YOURSELF OR YOUR FAMILY DOWN: 0
3. TROUBLE FALLING OR STAYING ASLEEP: 3
2. FEELING DOWN, DEPRESSED OR HOPELESS: 0
SUM OF ALL RESPONSES TO PHQ9 QUESTIONS 1 & 2: 0
4. FEELING TIRED OR HAVING LITTLE ENERGY: 1
5. POOR APPETITE OR OVEREATING: 0
SUM OF ALL RESPONSES TO PHQ QUESTIONS 1-9: 4
SUM OF ALL RESPONSES TO PHQ QUESTIONS 1-9: 4
8. MOVING OR SPEAKING SO SLOWLY THAT OTHER PEOPLE COULD HAVE NOTICED. OR THE OPPOSITE, BEING SO FIGETY OR RESTLESS THAT YOU HAVE BEEN MOVING AROUND A LOT MORE THAN USUAL: 0
9. THOUGHTS THAT YOU WOULD BE BETTER OFF DEAD, OR OF HURTING YOURSELF: 0
SUM OF ALL RESPONSES TO PHQ QUESTIONS 1-9: 4

## 2023-01-09 ASSESSMENT — ENCOUNTER SYMPTOMS
CONSTIPATION: 0
ABDOMINAL PAIN: 0
SHORTNESS OF BREATH: 0
BACK PAIN: 1
COUGH: 0
EYES NEGATIVE: 1
ALLERGIC/IMMUNOLOGIC NEGATIVE: 1
BLOOD IN STOOL: 0
DIARRHEA: 0

## 2023-01-09 NOTE — PROGRESS NOTES
[address] [facility] [address] [facility] [address]  Dept: [phone]    1/9/2023    CHIEF COMPLAINT    Chief Complaint   Patient presents with    Anxiety       HPI    Rebekah German is a 69 y.o. female who presents   Chief Complaint   Patient presents with    Anxiety   . Recheck chronic anxiety and back pain. Had an episode of panic one night, she called ems and was taken to [facility] who refused any more pts. Was then taken to [place] [address] where she was told to rest and then was discharged. She was having sx of nausea, palpitations, rt arm numbness and tingling, restlessness. Sx resolved after she rested. Taking pain meds percocet 5mg 2-3 daily. Vitals:    01/09/23 1103   BP: 110/62   Pulse: 78   Weight: 166 lb 3.2 oz (75.4 kg)   Height: 5' 6\" (1.676 m)       REVIEW OF SYSTEMS    Review of Systems   Constitutional:  Negative for fatigue, fever and unexpected weight change. HENT: Negative. Eyes: Negative. Respiratory:  Negative for cough and shortness of breath. Cardiovascular:  Negative for chest pain and leg swelling. Gastrointestinal:  Negative for abdominal pain, blood in stool, constipation and diarrhea. Endocrine: Negative. Genitourinary:  Negative for frequency and urgency. Musculoskeletal:  Positive for back pain. Skin: Negative. Allergic/Immunologic: Negative. Neurological:  Negative for dizziness and headaches. Hematological: Negative. Psychiatric/Behavioral:  Negative for sleep disturbance. The patient is nervous/anxious. PAST MEDICAL HISTORY    Past Medical History:   Diagnosis Date    Allergic rhinitis     Anxiety     Back pain, chronic     Broken teeth     broken front teeth from intubation during general anesthesia [facility])    Congenital single kidney     Foreign body of thumb, left, infected 05/05/2013    Wooden sliver of left thumb (5/5/2013).   Treated with antibiotics, responding well. Hepatitis A infection 1972 or 1973    history of sarthak this in college (from lab rats)    Hypertension     Mixed hyperlipidemia     Neuropathy     feet    Osteoarthritis     BACK    Post-operative nausea and vomiting     Status post dental restoration     front teeth replaced by dental implants (posts)    Wears contact lenses        FAMILY HISTORY    Family History   Problem Relation Age of Onset    High Blood Pressure Father     High Blood Pressure Sister        SOCIAL HISTORY    Social History     Socioeconomic History    Marital status:      Spouse name: None    Number of children: 3    Years of education: None    Highest education level: None   Tobacco Use    Smoking status: Never    Smokeless tobacco: Never   Substance and Sexual Activity    Alcohol use: No     Comment: States that she used to drink socially 20 years ago, but gave it up. Drug use: No    Sexual activity: Not Currently     Social Determinants of Health     Financial Resource Strain: Low Risk     Difficulty of Paying Living Expenses: Not hard at all   Food Insecurity: No Food Insecurity    Worried About [address] [place] [address] in the Last Year: Never true    Ran Out of Food in the Last Year: Never true   Physical Activity: Unknown    Days of Exercise per Week: 0 days       SURGICAL HISTORY    Past Surgical History:   Procedure Laterality Date    APPENDECTOMY  2003    CHOLECYSTECTOMY      during hysterectomy    FOREIGN BODY REMOVAL Right 05/17/2013    hand    HYSTERECTOMY (CERVIX STATUS UNKNOWN)  2003    LUMBAR DISC SURGERY      x 3    NERVE BLOCK  11/19/2014    duramorph, decadron 10mg    TONSILLECTOMY      TUBAL LIGATION         CURRENT MEDICATIONS    Current Outpatient Medications   Medication Sig Dispense Refill    MULTIPLE VITAMIN PO Take by mouth      oxyCODONE (ROXICODONE) 5 MG immediate release tablet Take 1 tablet by mouth every 8 hours as needed for Pain for up to 30 days.  Max Daily Amount: 15 mg 90 tablet 0    LORazepam (ATIVAN) 0.5 MG tablet Take 1 tablet by mouth every 8 hours as needed for Anxiety for up to 30 days. 90 tablet 0    citalopram (CELEXA) 20 MG tablet TAKE ONE TABLET BY MOUTH DAILY 30 tablet 5    hydroCHLOROthiazide (HYDRODIURIL) 12.5 MG tablet TAKE ONE TABLET BY MOUTH DAILY 90 tablet 3     No current facility-administered medications for this visit. ALLERGIES    Allergies   Allergen Reactions    Bactrim [Sulfamethoxazole-Trimethoprim] Swelling     Facial swelling    Lisinopril Other (See Comments)     Cough, keeping her awake at night       PHYSICAL EXAM   Physical Exam  Vitals reviewed. Constitutional:       Appearance: She is well-developed. HENT:      Head: Normocephalic. Eyes:      Pupils: Pupils are equal, round, and reactive to light. Neck:      Thyroid: No thyromegaly. Cardiovascular:      Rate and Rhythm: Normal rate and regular rhythm. Heart sounds: Normal heart sounds. No murmur heard. Pulmonary:      Effort: Pulmonary effort is normal.      Breath sounds: Normal breath sounds. No wheezing or rales. Abdominal:      Palpations: Abdomen is soft. Musculoskeletal:         General: Tenderness (low back pain) present. No deformity. Normal range of motion. Cervical back: Normal range of motion and neck supple. Lymphadenopathy:      Cervical: No cervical adenopathy. Skin:     General: Skin is warm and dry. Neurological:      Mental Status: She is alert and oriented to person, place, and time. Psychiatric:         Mood and Affect: Mood normal.         Behavior: Behavior normal.         Thought Content: Thought content normal.         Judgment: Judgment normal.       ASSESSMENT/PLAN  1. Chronic midline low back pain without sciatica  Continue trying to reduce use of opioids. We will changed to plain oxycodone due to elevated liver enzymes. Avoid excess Tylenol  - oxyCODONE (ROXICODONE) 5 MG immediate release tablet;  Take 1 tablet by mouth every 8 hours as needed for Pain for up to 30 days. Max Daily Amount: 15 mg  Dispense: 90 tablet; Refill: 0    2. Renal insufficiency  Discussed avoiding NSAIDs and rechecking. Stay well-hydrated  - Comprehensive Metabolic Panel; Future    3. NAFL (nonalcoholic fatty liver)  Recheck and to imaging if elevated further  - Comprehensive Metabolic Panel; Future     Donan received counseling on the following healthy behaviors: nutrition, exercise, and medication adherence  Reviewed prior labs and health maintenance. Continue current medications, diet and exercise. Discussed use, benefit, and side effects of prescribed medications. Barriers to medication compliance addressed. Patient given educational materials - see patient instructions. All patient questions answered. Patient voiced understanding. Return in about 3 months (around 4/9/2023) for chronic pain.         Electronically signed by Barbara Irizarry MD on 1/9/23 at 11:08 AM EST

## 2023-02-06 DIAGNOSIS — G89.29 CHRONIC MIDLINE LOW BACK PAIN WITHOUT SCIATICA: ICD-10-CM

## 2023-02-06 DIAGNOSIS — F41.9 ANXIETY: ICD-10-CM

## 2023-02-06 DIAGNOSIS — M54.50 CHRONIC MIDLINE LOW BACK PAIN WITHOUT SCIATICA: ICD-10-CM

## 2023-02-06 RX ORDER — OXYCODONE HYDROCHLORIDE 5 MG/1
5 TABLET ORAL EVERY 8 HOURS PRN
Qty: 80 TABLET | Refills: 0 | Status: SHIPPED | OUTPATIENT
Start: 2023-02-06 | End: 2023-03-08

## 2023-02-06 RX ORDER — LORAZEPAM 0.5 MG/1
0.5 TABLET ORAL EVERY 8 HOURS PRN
Qty: 90 TABLET | Refills: 0 | Status: SHIPPED | OUTPATIENT
Start: 2023-02-06 | End: 2023-03-08

## 2023-02-06 RX ORDER — OXYCODONE HYDROCHLORIDE AND ACETAMINOPHEN 5; 325 MG/1; MG/1
1 TABLET ORAL EVERY 4 HOURS PRN
Qty: 90 TABLET | Refills: 0 | Status: CANCELLED | OUTPATIENT
Start: 2023-02-06 | End: 2023-03-08

## 2023-02-07 NOTE — TELEPHONE ENCOUNTER
Reviewed Dr. Manan Chi notes. Percocet refill denied and switched for Roxicodone. Also reduced quantity per PCP POC.

## 2023-03-03 DIAGNOSIS — G89.29 CHRONIC MIDLINE LOW BACK PAIN WITHOUT SCIATICA: ICD-10-CM

## 2023-03-03 DIAGNOSIS — M54.50 CHRONIC MIDLINE LOW BACK PAIN WITHOUT SCIATICA: ICD-10-CM

## 2023-03-03 DIAGNOSIS — F41.9 ANXIETY: ICD-10-CM

## 2023-03-03 RX ORDER — OXYCODONE HYDROCHLORIDE 5 MG/1
5 TABLET ORAL EVERY 8 HOURS PRN
Qty: 80 TABLET | Refills: 0 | Status: SHIPPED | OUTPATIENT
Start: 2023-03-03 | End: 2023-04-02

## 2023-03-03 RX ORDER — LORAZEPAM 0.5 MG/1
0.5 TABLET ORAL EVERY 8 HOURS PRN
Qty: 90 TABLET | Refills: 0 | Status: SHIPPED | OUTPATIENT
Start: 2023-03-03 | End: 2023-04-02

## 2023-03-03 NOTE — TELEPHONE ENCOUNTER
Douglas Olson is calling to request a refill on the following medication(s):    Last Visit Date (If Applicable):  2/5/5599    Next Visit Date:    4/10/2023    Medication Request:  Requested Prescriptions     Pending Prescriptions Disp Refills    LORazepam (ATIVAN) 0.5 MG tablet 90 tablet 0     Sig: Take 1 tablet by mouth every 8 hours as needed for Anxiety for up to 30 days. oxyCODONE (ROXICODONE) 5 MG immediate release tablet 80 tablet 0     Sig: Take 1 tablet by mouth every 8 hours as needed for Pain for up to 30 days.  Max Daily Amount: 15 mg

## 2023-03-23 DIAGNOSIS — F32.9 REACTIVE DEPRESSION: ICD-10-CM

## 2023-03-23 RX ORDER — CITALOPRAM 20 MG/1
TABLET ORAL
Qty: 90 TABLET | Refills: 3 | Status: SHIPPED | OUTPATIENT
Start: 2023-03-23

## 2023-04-05 DIAGNOSIS — F41.9 ANXIETY: ICD-10-CM

## 2023-04-05 DIAGNOSIS — G89.29 CHRONIC MIDLINE LOW BACK PAIN WITHOUT SCIATICA: ICD-10-CM

## 2023-04-05 DIAGNOSIS — M54.50 CHRONIC MIDLINE LOW BACK PAIN WITHOUT SCIATICA: ICD-10-CM

## 2023-04-05 RX ORDER — LORAZEPAM 0.5 MG/1
0.5 TABLET ORAL EVERY 8 HOURS PRN
Qty: 90 TABLET | Refills: 0 | Status: SHIPPED | OUTPATIENT
Start: 2023-04-05 | End: 2023-05-05

## 2023-04-05 RX ORDER — OXYCODONE HYDROCHLORIDE 5 MG/1
5 TABLET ORAL EVERY 8 HOURS PRN
Qty: 80 TABLET | Refills: 0 | Status: SHIPPED | OUTPATIENT
Start: 2023-04-05 | End: 2023-05-05

## 2023-04-05 NOTE — TELEPHONE ENCOUNTER
----- Message from Thanh Marshall sent at 8/0/1851 10:30 AM EDT -----  Subject: Refill Request    QUESTIONS  Name of Medication? LORazepam (ATIVAN) 0.5 MG tablet  Patient-reported dosage and instructions? As needed   How many days do you have left? 6  Preferred Pharmacy? [id number]  Pharmacy phone number (if available)? [phone]  ---------------------------------------------------------------------------  --------------,  Name of Medication? oxyCODONE (ROXICODONE) 5 MG immediate release tablet  Patient-reported dosage and instructions? 1 every 8 hours for pain   How many days do you have left? 0  Preferred Pharmacy? [address] [place] [address] [id number]  Pharmacy phone number (if available)? [phone]  ---------------------------------------------------------------------------  --------------  CALL BACK INFO  What is the best way for the office to contact you? OK to leave message on   voicemail  Preferred Call Back Phone Number? [phone]  ---------------------------------------------------------------------------  --------------  SCRIPT ANSWERS  Relationship to Patient?  Self

## 2023-05-08 DIAGNOSIS — M54.50 CHRONIC MIDLINE LOW BACK PAIN WITHOUT SCIATICA: ICD-10-CM

## 2023-05-08 DIAGNOSIS — F41.9 ANXIETY: ICD-10-CM

## 2023-05-08 DIAGNOSIS — G89.29 CHRONIC MIDLINE LOW BACK PAIN WITHOUT SCIATICA: ICD-10-CM

## 2023-05-08 RX ORDER — OXYCODONE HYDROCHLORIDE 5 MG/1
5 TABLET ORAL EVERY 8 HOURS PRN
Qty: 80 TABLET | Refills: 0 | Status: SHIPPED | OUTPATIENT
Start: 2023-05-08 | End: 2023-06-07

## 2023-05-08 RX ORDER — LORAZEPAM 0.5 MG/1
0.5 TABLET ORAL EVERY 8 HOURS PRN
Qty: 90 TABLET | Refills: 0 | Status: SHIPPED | OUTPATIENT
Start: 2023-05-08 | End: 2023-06-07

## 2023-05-08 NOTE — TELEPHONE ENCOUNTER
Marlen Mabry is calling to request a refill on the following medication(s):    Last Visit Date (If Applicable):  7/0/2190    Next Visit Date:    5/15/2023    Medication Request:  Requested Prescriptions     Pending Prescriptions Disp Refills    oxyCODONE (ROXICODONE) 5 MG immediate release tablet 80 tablet 0     Sig: Take 1 tablet by mouth every 8 hours as needed for Pain for up to 30 days. Max Daily Amount: 15 mg    LORazepam (ATIVAN) 0.5 MG tablet 90 tablet 0     Sig: Take 1 tablet by mouth every 8 hours as needed for Anxiety for up to 30 days.

## 2023-05-14 SDOH — ECONOMIC STABILITY: INCOME INSECURITY: HOW HARD IS IT FOR YOU TO PAY FOR THE VERY BASICS LIKE FOOD, HOUSING, MEDICAL CARE, AND HEATING?: SOMEWHAT HARD

## 2023-05-14 SDOH — ECONOMIC STABILITY: FOOD INSECURITY: WITHIN THE PAST 12 MONTHS, YOU WORRIED THAT YOUR FOOD WOULD RUN OUT BEFORE YOU GOT MONEY TO BUY MORE.: SOMETIMES TRUE

## 2023-05-14 SDOH — ECONOMIC STABILITY: HOUSING INSECURITY
IN THE LAST 12 MONTHS, WAS THERE A TIME WHEN YOU DID NOT HAVE A STEADY PLACE TO SLEEP OR SLEPT IN A SHELTER (INCLUDING NOW)?: NO

## 2023-05-14 SDOH — ECONOMIC STABILITY: FOOD INSECURITY: WITHIN THE PAST 12 MONTHS, THE FOOD YOU BOUGHT JUST DIDN'T LAST AND YOU DIDN'T HAVE MONEY TO GET MORE.: NEVER TRUE

## 2023-05-15 ENCOUNTER — OFFICE VISIT (OUTPATIENT)
Dept: FAMILY MEDICINE CLINIC | Age: 68
End: 2023-05-15
Payer: COMMERCIAL

## 2023-05-15 ENCOUNTER — HOSPITAL ENCOUNTER (OUTPATIENT)
Age: 68
Setting detail: SPECIMEN
Discharge: HOME | End: 2023-05-15

## 2023-05-15 VITALS
DIASTOLIC BLOOD PRESSURE: 90 MMHG | BODY MASS INDEX: 28.02 KG/M2 | SYSTOLIC BLOOD PRESSURE: 140 MMHG | WEIGHT: 173.6 LBS | HEART RATE: 90 BPM

## 2023-05-15 DIAGNOSIS — I10 ESSENTIAL HYPERTENSION: Primary | ICD-10-CM

## 2023-05-15 DIAGNOSIS — Q60.0 CONGENITAL SINGLE KIDNEY: ICD-10-CM

## 2023-05-15 DIAGNOSIS — N18.31 STAGE 3A CHRONIC KIDNEY DISEASE (HCC): ICD-10-CM

## 2023-05-15 DIAGNOSIS — M54.50 CHRONIC MIDLINE LOW BACK PAIN WITHOUT SCIATICA: ICD-10-CM

## 2023-05-15 DIAGNOSIS — G89.29 CHRONIC MIDLINE LOW BACK PAIN WITHOUT SCIATICA: ICD-10-CM

## 2023-05-15 LAB
ANION GAP SERPL CALCULATED.3IONS-SCNC: 11 MMOL/L (ref 9–17)
BUN SERPL-MCNC: 18 MG/DL (ref 8–23)
CALCIUM SERPL-MCNC: 9.2 MG/DL (ref 8.6–10.4)
CHLORIDE SERPL-SCNC: 103 MMOL/L (ref 98–107)
CO2 SERPL-SCNC: 22 MMOL/L (ref 20–31)
CREAT SERPL-MCNC: 1.02 MG/DL (ref 0.5–0.9)
GFR SERPL CREATININE-BSD FRML MDRD: 60 ML/MIN/1.73M2
GLUCOSE SERPL-MCNC: 105 MG/DL (ref 70–99)
POTASSIUM SERPL-SCNC: 4.2 MMOL/L (ref 3.7–5.3)
SODIUM SERPL-SCNC: 136 MMOL/L (ref 135–144)

## 2023-05-15 PROCEDURE — 3077F SYST BP >= 140 MM HG: CPT | Performed by: FAMILY MEDICINE

## 2023-05-15 PROCEDURE — 3080F DIAST BP >= 90 MM HG: CPT | Performed by: FAMILY MEDICINE

## 2023-05-15 PROCEDURE — 1123F ACP DISCUSS/DSCN MKR DOCD: CPT | Performed by: FAMILY MEDICINE

## 2023-05-15 PROCEDURE — 99213 OFFICE O/P EST LOW 20 MIN: CPT | Performed by: FAMILY MEDICINE

## 2023-05-15 RX ORDER — OXYCODONE HYDROCHLORIDE 5 MG/1
5 TABLET ORAL EVERY 8 HOURS PRN
Qty: 90 TABLET | Refills: 0
Start: 2023-05-15 | End: 2023-06-14

## 2023-05-15 ASSESSMENT — ENCOUNTER SYMPTOMS
BLOOD IN STOOL: 0
ALLERGIC/IMMUNOLOGIC NEGATIVE: 1
EYES NEGATIVE: 1
CONSTIPATION: 0
SHORTNESS OF BREATH: 0
ABDOMINAL PAIN: 0
COUGH: 0
DIARRHEA: 0
BACK PAIN: 1

## 2023-05-15 NOTE — PROGRESS NOTES
this in college (from lab rats)    Hypertension     Mixed hyperlipidemia     Neuropathy     feet    Osteoarthritis     BACK    Post-operative nausea and vomiting     Status post dental restoration     front teeth replaced by dental implants (posts)    Wears contact lenses        FAMILY HISTORY    Family History   Problem Relation Age of Onset    High Blood Pressure Father     High Blood Pressure Sister        SOCIAL HISTORY    Social History     Socioeconomic History    Marital status:      Spouse name: None    Number of children: 3    Years of education: None    Highest education level: None   Tobacco Use    Smoking status: Never    Smokeless tobacco: Never   Substance and Sexual Activity    Alcohol use: No     Comment: States that she used to drink socially 20 years ago, but gave it up. Drug use: No    Sexual activity: Not Currently     Social Determinants of Health     Financial Resource Strain: Medium Risk    Difficulty of Paying Living Expenses: Somewhat hard   Food Insecurity: Food Insecurity Present    Worried About Running Out of Food in the Last Year: Sometimes true    Ran Out of Food in the Last Year: Never true   Transportation Needs: Unknown    Lack of Transportation (Non-Medical): No   Physical Activity: Unknown    Days of Exercise per Week: 0 days   Housing Stability: Unknown    Unstable Housing in the Last Year: No       SURGICAL HISTORY    Past Surgical History:   Procedure Laterality Date    APPENDECTOMY  2003    CHOLECYSTECTOMY      during hysterectomy    FOREIGN BODY REMOVAL Right 05/17/2013    hand    HYSTERECTOMY (CERVIX STATUS UNKNOWN)  2003    LUMBAR DISC SURGERY      x 3    NERVE BLOCK  11/19/2014    duramorph, decadron 10mg    TONSILLECTOMY      TUBAL LIGATION         CURRENT MEDICATIONS    Current Outpatient Medications   Medication Sig Dispense Refill    oxyCODONE (ROXICODONE) 5 MG immediate release tablet Take 1 tablet by mouth every 8 hours as needed for Pain for up to 30 days.

## 2023-06-05 DIAGNOSIS — F32.9 REACTIVE DEPRESSION: ICD-10-CM

## 2023-06-05 DIAGNOSIS — F41.9 ANXIETY: ICD-10-CM

## 2023-06-05 DIAGNOSIS — G89.29 CHRONIC MIDLINE LOW BACK PAIN WITHOUT SCIATICA: ICD-10-CM

## 2023-06-05 DIAGNOSIS — M54.50 CHRONIC MIDLINE LOW BACK PAIN WITHOUT SCIATICA: ICD-10-CM

## 2023-06-05 RX ORDER — OXYCODONE HYDROCHLORIDE 5 MG/1
5 TABLET ORAL EVERY 8 HOURS PRN
Qty: 90 TABLET | Refills: 0 | Status: SHIPPED | OUTPATIENT
Start: 2023-06-05 | End: 2023-07-05

## 2023-06-05 RX ORDER — CITALOPRAM 20 MG/1
20 TABLET ORAL DAILY
Qty: 90 TABLET | Refills: 0 | Status: SHIPPED | OUTPATIENT
Start: 2023-06-05

## 2023-06-05 RX ORDER — LORAZEPAM 0.5 MG/1
0.5 TABLET ORAL EVERY 8 HOURS PRN
Qty: 90 TABLET | Refills: 0 | Status: SHIPPED | OUTPATIENT
Start: 2023-06-05 | End: 2023-07-05

## 2023-06-05 NOTE — TELEPHONE ENCOUNTER
Avelino Padilla is calling to request a refill on the following medication(s):    Medication Request:  Requested Prescriptions     Pending Prescriptions Disp Refills    citalopram (CELEXA) 20 MG tablet 90 tablet 0     Sig: Take 1 tablet by mouth daily    oxyCODONE (ROXICODONE) 5 MG immediate release tablet 90 tablet 0     Sig: Take 1 tablet by mouth every 8 hours as needed for Pain for up to 30 days. Max Daily Amount: 15 mg    LORazepam (ATIVAN) 0.5 MG tablet 90 tablet 0     Sig: Take 1 tablet by mouth every 8 hours as needed for Anxiety for up to 30 days.        Last Visit Date (If Applicable):  1/22/8901    Next Visit Date:    8/8/2023

## 2023-07-07 DIAGNOSIS — M54.50 CHRONIC MIDLINE LOW BACK PAIN WITHOUT SCIATICA: ICD-10-CM

## 2023-07-07 DIAGNOSIS — G89.29 CHRONIC MIDLINE LOW BACK PAIN WITHOUT SCIATICA: ICD-10-CM

## 2023-07-07 DIAGNOSIS — F41.9 ANXIETY: ICD-10-CM

## 2023-07-07 RX ORDER — LORAZEPAM 0.5 MG/1
0.5 TABLET ORAL EVERY 8 HOURS PRN
Qty: 90 TABLET | Refills: 0 | Status: SHIPPED | OUTPATIENT
Start: 2023-07-07 | End: 2023-08-06

## 2023-07-07 RX ORDER — OXYCODONE HYDROCHLORIDE 5 MG/1
5 TABLET ORAL EVERY 8 HOURS PRN
Qty: 90 TABLET | Refills: 0 | Status: SHIPPED | OUTPATIENT
Start: 2023-07-07 | End: 2023-08-06

## 2023-07-07 NOTE — TELEPHONE ENCOUNTER
Silvia Whitten is calling to request a refill on the following medication(s):    Last Visit Date (If Applicable):  1/31/9297    Next Visit Date:    8/8/2023    Medication Request:  Requested Prescriptions     Pending Prescriptions Disp Refills    LORazepam (ATIVAN) 0.5 MG tablet 90 tablet 0     Sig: Take 1 tablet by mouth every 8 hours as needed for Anxiety for up to 30 days. oxyCODONE (ROXICODONE) 5 MG immediate release tablet 90 tablet 0     Sig: Take 1 tablet by mouth every 8 hours as needed for Pain for up to 30 days.  Max Daily Amount: 15 mg

## 2023-08-04 DIAGNOSIS — F41.9 ANXIETY: ICD-10-CM

## 2023-08-04 DIAGNOSIS — G89.29 CHRONIC MIDLINE LOW BACK PAIN WITHOUT SCIATICA: ICD-10-CM

## 2023-08-04 DIAGNOSIS — M54.50 CHRONIC MIDLINE LOW BACK PAIN WITHOUT SCIATICA: ICD-10-CM

## 2023-08-04 NOTE — TELEPHONE ENCOUNTER
Cecy Lew is calling to request a refill on the following medication(s):    Last Visit Date (If Applicable):  6/62/0152    Next Visit Date:    8/8/2023    Medication Request:  Requested Prescriptions     Pending Prescriptions Disp Refills    LORazepam (ATIVAN) 0.5 MG tablet 90 tablet 0     Sig: Take 1 tablet by mouth every 8 hours as needed for Anxiety for up to 30 days. oxyCODONE (ROXICODONE) 5 MG immediate release tablet 90 tablet 0     Sig: Take 1 tablet by mouth every 8 hours as needed for Pain for up to 30 days.  Max Daily Amount: 15 mg

## 2023-08-07 RX ORDER — LORAZEPAM 0.5 MG/1
0.5 TABLET ORAL EVERY 8 HOURS PRN
Qty: 90 TABLET | Refills: 0 | Status: SHIPPED | OUTPATIENT
Start: 2023-08-07 | End: 2023-09-06

## 2023-08-07 RX ORDER — OXYCODONE HYDROCHLORIDE 5 MG/1
5 TABLET ORAL EVERY 8 HOURS PRN
Qty: 90 TABLET | Refills: 0 | Status: SHIPPED | OUTPATIENT
Start: 2023-08-07 | End: 2023-09-06

## 2023-08-08 ENCOUNTER — OFFICE VISIT (OUTPATIENT)
Dept: FAMILY MEDICINE CLINIC | Age: 68
End: 2023-08-08
Payer: COMMERCIAL

## 2023-08-08 VITALS
WEIGHT: 174 LBS | BODY MASS INDEX: 28.08 KG/M2 | HEART RATE: 76 BPM | DIASTOLIC BLOOD PRESSURE: 92 MMHG | SYSTOLIC BLOOD PRESSURE: 140 MMHG

## 2023-08-08 DIAGNOSIS — L98.9 SKIN LESION: ICD-10-CM

## 2023-08-08 DIAGNOSIS — N18.31 STAGE 3A CHRONIC KIDNEY DISEASE (HCC): ICD-10-CM

## 2023-08-08 DIAGNOSIS — M54.50 CHRONIC MIDLINE LOW BACK PAIN WITHOUT SCIATICA: Primary | ICD-10-CM

## 2023-08-08 DIAGNOSIS — G89.29 CHRONIC MIDLINE LOW BACK PAIN WITHOUT SCIATICA: Primary | ICD-10-CM

## 2023-08-08 DIAGNOSIS — I10 ESSENTIAL HYPERTENSION: ICD-10-CM

## 2023-08-08 DIAGNOSIS — F41.9 ANXIETY: ICD-10-CM

## 2023-08-08 PROCEDURE — 3077F SYST BP >= 140 MM HG: CPT | Performed by: FAMILY MEDICINE

## 2023-08-08 PROCEDURE — 3080F DIAST BP >= 90 MM HG: CPT | Performed by: FAMILY MEDICINE

## 2023-08-08 PROCEDURE — 1123F ACP DISCUSS/DSCN MKR DOCD: CPT | Performed by: FAMILY MEDICINE

## 2023-08-08 PROCEDURE — 99214 OFFICE O/P EST MOD 30 MIN: CPT | Performed by: FAMILY MEDICINE

## 2023-08-08 RX ORDER — AMLODIPINE BESYLATE 2.5 MG/1
2.5 TABLET ORAL DAILY
Qty: 90 TABLET | Refills: 1 | Status: SHIPPED | OUTPATIENT
Start: 2023-08-08

## 2023-08-08 ASSESSMENT — ENCOUNTER SYMPTOMS
EYES NEGATIVE: 1
BLOOD IN STOOL: 0
SHORTNESS OF BREATH: 0
DIARRHEA: 0
COUGH: 0
ABDOMINAL PAIN: 0
ALLERGIC/IMMUNOLOGIC NEGATIVE: 1
ROS SKIN COMMENTS: MULTIPLE SKIN LESIONS
BACK PAIN: 1
CONSTIPATION: 0

## 2023-08-08 NOTE — PROGRESS NOTES
[address] [facility] [address] [facility] [address]  Dept: [phone]    8/8/2023    CHIEF COMPLAINT    Chief Complaint   Patient presents with    Hypertension    Anxiety       HPI    Kevin Bennett is a 69 y.o. female who presents   Chief Complaint   Patient presents with    Hypertension    Anxiety   . Recheck chronic conditions including anxiety, htn and chronic back pain. She stopped hctz due to elevated creatinine. She notices that her bp is lower if she takes lorazepam.   Chronic back pain is tolerable with current meds. Vitals:    08/08/23 0946 08/08/23 1146   BP: (!) 144/92 (!) 140/92   Pulse: 76    Weight: 174 lb (78.9 kg)        REVIEW OF SYSTEMS    Review of Systems   Constitutional:  Negative for fatigue, fever and unexpected weight change. HENT: Negative. Eyes: Negative. Respiratory:  Negative for cough and shortness of breath. Cardiovascular:  Negative for chest pain and leg swelling. Gastrointestinal:  Negative for abdominal pain, blood in stool, constipation and diarrhea. Endocrine: Negative. Genitourinary:  Negative for frequency and urgency. Musculoskeletal:  Positive for back pain. Skin: Negative. Multiple skin lesions   Allergic/Immunologic: Negative. Neurological:  Negative for dizziness and headaches. Hematological: Negative. Psychiatric/Behavioral:  Negative for sleep disturbance. The patient is not nervous/anxious. PAST MEDICAL HISTORY    Past Medical History:   Diagnosis Date    Allergic rhinitis     Anxiety     Back pain, chronic     Broken teeth     broken front teeth from intubation during general anesthesia [facility])    Congenital single kidney     Foreign body of thumb, left, infected 05/05/2013    Wooden sliver of left thumb (5/5/2013). Treated with antibiotics, responding well.      Hepatitis A infection 1972 or 1973    history of sarthak this in

## 2023-09-01 DIAGNOSIS — F41.9 ANXIETY: ICD-10-CM

## 2023-09-01 DIAGNOSIS — G89.29 CHRONIC MIDLINE LOW BACK PAIN WITHOUT SCIATICA: ICD-10-CM

## 2023-09-01 DIAGNOSIS — M54.50 CHRONIC MIDLINE LOW BACK PAIN WITHOUT SCIATICA: ICD-10-CM

## 2023-09-01 RX ORDER — OXYCODONE HYDROCHLORIDE 5 MG/1
5 TABLET ORAL EVERY 8 HOURS PRN
Qty: 90 TABLET | Refills: 0 | Status: CANCELLED | OUTPATIENT
Start: 2023-09-01 | End: 2023-10-01

## 2023-09-01 RX ORDER — LORAZEPAM 0.5 MG/1
0.5 TABLET ORAL EVERY 8 HOURS PRN
Qty: 90 TABLET | Refills: 0 | Status: CANCELLED | OUTPATIENT
Start: 2023-09-01 | End: 2023-10-01

## 2023-09-01 NOTE — TELEPHONE ENCOUNTER
Kyara Hoffman is calling to request a refill on the following medication(s):    Medication Request:  Requested Prescriptions     Pending Prescriptions Disp Refills    LORazepam (ATIVAN) 0.5 MG tablet 90 tablet 0     Sig: Take 1 tablet by mouth every 8 hours as needed for Anxiety for up to 30 days. oxyCODONE (ROXICODONE) 5 MG immediate release tablet 90 tablet 0     Sig: Take 1 tablet by mouth every 8 hours as needed for Pain for up to 30 days.  Max Daily Amount: 15 mg       Last Visit Date (If Applicable):  4/1/6331    Next Visit Date:    11/13/2023

## 2023-09-01 NOTE — TELEPHONE ENCOUNTER
LVM for patient to call back on Tuesday to re-order per Dr. Alexandra Prasad:       Inform pt too early, call back when due, sept 5th

## 2023-09-05 DIAGNOSIS — F41.9 ANXIETY: ICD-10-CM

## 2023-09-05 DIAGNOSIS — M54.50 CHRONIC MIDLINE LOW BACK PAIN WITHOUT SCIATICA: ICD-10-CM

## 2023-09-05 DIAGNOSIS — G89.29 CHRONIC MIDLINE LOW BACK PAIN WITHOUT SCIATICA: ICD-10-CM

## 2023-09-05 RX ORDER — OXYCODONE HYDROCHLORIDE 5 MG/1
5 TABLET ORAL EVERY 8 HOURS PRN
Qty: 90 TABLET | Refills: 0 | Status: SHIPPED | OUTPATIENT
Start: 2023-09-05 | End: 2023-10-09 | Stop reason: SDUPTHER

## 2023-09-05 RX ORDER — LORAZEPAM 0.5 MG/1
0.5 TABLET ORAL EVERY 8 HOURS PRN
Qty: 90 TABLET | Refills: 0 | Status: SHIPPED | OUTPATIENT
Start: 2023-09-05 | End: 2023-10-09 | Stop reason: SDUPTHER

## 2023-09-19 ENCOUNTER — TELEPHONE (OUTPATIENT)
Dept: FAMILY MEDICINE CLINIC | Age: 68
End: 2023-09-19

## 2023-09-19 DIAGNOSIS — R04.0 NASAL BLEEDING: Primary | ICD-10-CM

## 2023-09-19 NOTE — TELEPHONE ENCOUNTER
Patient states she has been experiencing constant nose bleeds for the past week. She says she did start bp med about 6 weeks ago but her nose is sore and she is constantly using tissue because it is always bleeding.  Please advise

## 2023-10-09 DIAGNOSIS — M54.50 CHRONIC MIDLINE LOW BACK PAIN WITHOUT SCIATICA: ICD-10-CM

## 2023-10-09 DIAGNOSIS — F41.9 ANXIETY: ICD-10-CM

## 2023-10-09 DIAGNOSIS — G89.29 CHRONIC MIDLINE LOW BACK PAIN WITHOUT SCIATICA: ICD-10-CM

## 2023-10-09 RX ORDER — LORAZEPAM 0.5 MG/1
0.5 TABLET ORAL EVERY 8 HOURS PRN
Qty: 90 TABLET | Refills: 0 | Status: SHIPPED | OUTPATIENT
Start: 2023-10-09 | End: 2023-11-08

## 2023-10-09 RX ORDER — OXYCODONE HYDROCHLORIDE 5 MG/1
5 TABLET ORAL EVERY 8 HOURS PRN
Qty: 90 TABLET | Refills: 0 | Status: SHIPPED | OUTPATIENT
Start: 2023-10-09 | End: 2023-11-08

## 2023-10-09 NOTE — TELEPHONE ENCOUNTER
Nader Johnson is calling to request a refill on the following medication(s):    Last Visit Date (If Applicable):  2/3/3244    Next Visit Date:    11/13/2023    Medication Request:  Requested Prescriptions     Pending Prescriptions Disp Refills    LORazepam (ATIVAN) 0.5 MG tablet 90 tablet 0     Sig: Take 1 tablet by mouth every 8 hours as needed for Anxiety for up to 30 days. oxyCODONE (ROXICODONE) 5 MG immediate release tablet 90 tablet 0     Sig: Take 1 tablet by mouth every 8 hours as needed for Pain for up to 30 days.  Max Daily Amount: 15 mg

## 2023-11-09 DIAGNOSIS — F41.9 ANXIETY: ICD-10-CM

## 2023-11-09 DIAGNOSIS — G89.29 CHRONIC MIDLINE LOW BACK PAIN WITHOUT SCIATICA: ICD-10-CM

## 2023-11-09 DIAGNOSIS — M54.50 CHRONIC MIDLINE LOW BACK PAIN WITHOUT SCIATICA: ICD-10-CM

## 2023-11-09 RX ORDER — OXYCODONE HYDROCHLORIDE 5 MG/1
5 TABLET ORAL EVERY 8 HOURS PRN
Qty: 90 TABLET | Refills: 0 | Status: SHIPPED | OUTPATIENT
Start: 2023-11-09 | End: 2023-12-09

## 2023-11-09 RX ORDER — LORAZEPAM 0.5 MG/1
0.5 TABLET ORAL EVERY 8 HOURS PRN
Qty: 90 TABLET | Refills: 0 | Status: SHIPPED | OUTPATIENT
Start: 2023-11-09 | End: 2023-12-09

## 2023-11-09 NOTE — TELEPHONE ENCOUNTER
----- Message from Ed Tonia sent at 11/8/2023  3:59 PM EST -----  Subject: Refill Request    QUESTIONS  Name of Medication? oxyCODONE (ROXICODONE) 5 MG immediate release tablet  Patient-reported dosage and instructions? 5 MG immediate release Tablet 1   Tablet by mouth every 8 hours as needed for pain  How many days do you have left? 1  Preferred Pharmacy? [address] [id number]  Pharmacy phone number (if available)? [phone]  ---------------------------------------------------------------------------  --------------,  Name of Medication? LORazepam (ATIVAN) 0.5 MG tablet  Patient-reported dosage and instructions? 0.5 MG Tablet 1 Tablet by mouth   every 8 hours if needed for anxiety  How many days do you have left? 2  Preferred Pharmacy? [address] [place], [place] [address] [id number]  Pharmacy phone number (if available)? [phone]  ---------------------------------------------------------------------------  --------------  CALL BACK INFO  What is the best way for the office to contact you? OK to leave message on   voicemail  Preferred Call Back Phone Number? [phone]  ---------------------------------------------------------------------------  --------------  SCRIPT ANSWERS  Relationship to Patient?  Self

## 2023-12-11 DIAGNOSIS — G89.29 CHRONIC MIDLINE LOW BACK PAIN WITHOUT SCIATICA: ICD-10-CM

## 2023-12-11 DIAGNOSIS — M54.50 CHRONIC MIDLINE LOW BACK PAIN WITHOUT SCIATICA: ICD-10-CM

## 2023-12-11 DIAGNOSIS — F41.9 ANXIETY: ICD-10-CM

## 2023-12-11 RX ORDER — LORAZEPAM 0.5 MG/1
0.5 TABLET ORAL EVERY 8 HOURS PRN
Qty: 90 TABLET | Refills: 0 | Status: SHIPPED | OUTPATIENT
Start: 2023-12-11 | End: 2024-01-10

## 2023-12-11 RX ORDER — OXYCODONE HYDROCHLORIDE 5 MG/1
5 TABLET ORAL EVERY 8 HOURS PRN
Qty: 90 TABLET | Refills: 0 | Status: SHIPPED | OUTPATIENT
Start: 2023-12-11 | End: 2024-01-10

## 2023-12-11 NOTE — TELEPHONE ENCOUNTER
Current Outpatient Medications on File Prior to Visit   Medication Sig Dispense Refill    amLODIPine (NORVASC) 2.5 MG tablet Take 1 tablet by mouth daily 90 tablet 1    citalopram (CELEXA) 20 MG tablet Take 1 tablet by mouth daily 90 tablet 0    MULTIPLE VITAMIN PO Take by mouth       No current facility-administered medications on file prior to visit.

## 2023-12-14 ENCOUNTER — OFFICE VISIT (OUTPATIENT)
Dept: FAMILY MEDICINE CLINIC | Age: 68
End: 2023-12-14
Payer: COMMERCIAL

## 2023-12-14 VITALS
OXYGEN SATURATION: 96 % | BODY MASS INDEX: 27.44 KG/M2 | HEART RATE: 75 BPM | DIASTOLIC BLOOD PRESSURE: 70 MMHG | SYSTOLIC BLOOD PRESSURE: 120 MMHG | WEIGHT: 170 LBS

## 2023-12-14 DIAGNOSIS — M54.50 CHRONIC MIDLINE LOW BACK PAIN WITHOUT SCIATICA: Primary | ICD-10-CM

## 2023-12-14 DIAGNOSIS — G89.29 CHRONIC MIDLINE LOW BACK PAIN WITHOUT SCIATICA: Primary | ICD-10-CM

## 2023-12-14 DIAGNOSIS — I10 ESSENTIAL HYPERTENSION: ICD-10-CM

## 2023-12-14 DIAGNOSIS — E55.9 VITAMIN D DEFICIENCY: ICD-10-CM

## 2023-12-14 DIAGNOSIS — R73.09 ABNORMAL GLUCOSE: ICD-10-CM

## 2023-12-14 DIAGNOSIS — E78.2 MIXED HYPERLIPIDEMIA: ICD-10-CM

## 2023-12-14 DIAGNOSIS — N28.9 RENAL INSUFFICIENCY: ICD-10-CM

## 2023-12-14 DIAGNOSIS — R04.0 EPISTAXIS: ICD-10-CM

## 2023-12-14 DIAGNOSIS — F41.9 ANXIETY: ICD-10-CM

## 2023-12-14 PROCEDURE — 3078F DIAST BP <80 MM HG: CPT | Performed by: FAMILY MEDICINE

## 2023-12-14 PROCEDURE — 99214 OFFICE O/P EST MOD 30 MIN: CPT | Performed by: FAMILY MEDICINE

## 2023-12-14 PROCEDURE — 3074F SYST BP LT 130 MM HG: CPT | Performed by: FAMILY MEDICINE

## 2023-12-14 PROCEDURE — 1123F ACP DISCUSS/DSCN MKR DOCD: CPT | Performed by: FAMILY MEDICINE

## 2023-12-14 NOTE — PROGRESS NOTES
05/05/2013    Wooden sliver of left thumb (5/5/2013). Treated with antibiotics, responding well. Hepatitis A infection 1972 or 1973    history of sarthak this in college (from lab rats)    Hypertension     Mixed hyperlipidemia     Neuropathy     feet    Osteoarthritis     BACK    Post-operative nausea and vomiting     Status post dental restoration     front teeth replaced by dental implants (posts)    Wears contact lenses        FAMILY HISTORY    Family History   Problem Relation Age of Onset    High Blood Pressure Father     High Blood Pressure Sister        SOCIAL HISTORY    Social History     Socioeconomic History    Marital status:      Spouse name: None    Number of children: 3    Years of education: None    Highest education level: None   Tobacco Use    Smoking status: Never    Smokeless tobacco: Never   Substance and Sexual Activity    Alcohol use: No     Comment: States that she used to drink socially 20 years ago, but gave it up. Drug use: No    Sexual activity: Not Currently     Social Determinants of Health     Financial Resource Strain: Medium Risk (5/14/2023)    Overall Financial Resource Strain (CARDIA)     Difficulty of Paying Living Expenses: Somewhat hard   Food Insecurity:   Recent Concern: Food Insecurity - Food Insecurity Present (5/14/2023)    Hunger Vital Sign     Worried About Running Out of Food in the Last Year: Sometimes true     Ran Out of Food in the Last Year: Never true   Transportation Needs: Unknown (5/14/2023)    PRAPARE - Transportation     Lack of Transportation (Non-Medical):  No   Physical Activity: Unknown (9/8/2022)    Exercise Vital Sign     Days of Exercise per Week: 0 days   Housing Stability: Unknown (5/14/2023)    Housing Stability Vital Sign     Unstable Housing in the Last Year: No       SURGICAL HISTORY    Past Surgical History:   Procedure Laterality Date    APPENDECTOMY  2003    CHOLECYSTECTOMY      during hysterectomy    FOREIGN BODY REMOVAL Right

## 2024-01-10 DIAGNOSIS — M54.50 CHRONIC MIDLINE LOW BACK PAIN WITHOUT SCIATICA: ICD-10-CM

## 2024-01-10 DIAGNOSIS — F41.9 ANXIETY: ICD-10-CM

## 2024-01-10 DIAGNOSIS — G89.29 CHRONIC MIDLINE LOW BACK PAIN WITHOUT SCIATICA: ICD-10-CM

## 2024-01-10 RX ORDER — OXYCODONE HYDROCHLORIDE 5 MG/1
5 TABLET ORAL EVERY 8 HOURS PRN
Qty: 90 TABLET | Refills: 0 | Status: SHIPPED | OUTPATIENT
Start: 2024-01-10 | End: 2024-02-09

## 2024-01-10 RX ORDER — LORAZEPAM 0.5 MG/1
0.5 TABLET ORAL EVERY 8 HOURS PRN
Qty: 90 TABLET | Refills: 0 | Status: SHIPPED | OUTPATIENT
Start: 2024-01-10 | End: 2024-02-09

## 2024-01-10 NOTE — TELEPHONE ENCOUNTER
----- Message from Heidi Noreen sent at 1/10/2024  9:49 AM EST -----  Subject: Refill Request    QUESTIONS  Name of Medication? oxyCODONE (ROXICODONE) 5 MG immediate release tablet  Patient-reported dosage and instructions? up to 3 times daily  How many days do you have left? 0  Preferred Pharmacy? [facility] #[id number]  Pharmacy phone number (if available)? [phone]  ---------------------------------------------------------------------------  --------------,  Name of Medication? LORazepam (ATIVAN) 0.5 MG tablet  Patient-reported dosage and instructions? 1 every 8 hours as needed  How many days do you have left? 2  Preferred Pharmacy? [facility] #[id number]  Pharmacy phone number (if available)? [phone]  ---------------------------------------------------------------------------  --------------  CALL BACK INFO  What is the best way for the office to contact you? OK to leave message on   voicemail  Preferred Call Back Phone Number? [phone]  ---------------------------------------------------------------------------  --------------  SCRIPT ANSWERS  Relationship to Patient? Self

## 2024-01-20 DIAGNOSIS — I10 ESSENTIAL HYPERTENSION: ICD-10-CM

## 2024-01-22 RX ORDER — AMLODIPINE BESYLATE 2.5 MG/1
2.5 TABLET ORAL DAILY
Qty: 90 TABLET | Refills: 3 | Status: SHIPPED | OUTPATIENT
Start: 2024-01-22

## 2024-02-08 DIAGNOSIS — M54.50 CHRONIC MIDLINE LOW BACK PAIN WITHOUT SCIATICA: ICD-10-CM

## 2024-02-08 DIAGNOSIS — F41.9 ANXIETY: ICD-10-CM

## 2024-02-08 DIAGNOSIS — G89.29 CHRONIC MIDLINE LOW BACK PAIN WITHOUT SCIATICA: ICD-10-CM

## 2024-02-08 RX ORDER — OXYCODONE HYDROCHLORIDE 5 MG/1
5 TABLET ORAL EVERY 8 HOURS PRN
Qty: 90 TABLET | Refills: 0 | Status: SHIPPED | OUTPATIENT
Start: 2024-02-08 | End: 2024-03-09

## 2024-02-08 RX ORDER — LORAZEPAM 0.5 MG/1
0.5 TABLET ORAL EVERY 8 HOURS PRN
Qty: 90 TABLET | Refills: 0 | Status: SHIPPED | OUTPATIENT
Start: 2024-02-08 | End: 2024-03-09

## 2024-02-08 NOTE — TELEPHONE ENCOUNTER
Donna Zeng is calling to request a refill on the following medication(s):    Last Visit Date (If Applicable):  12/14/2023    Next Visit Date:    3/14/2024    Medication Request:  Requested Prescriptions     Pending Prescriptions Disp Refills    LORazepam (ATIVAN) 0.5 MG tablet 90 tablet 0     Sig: Take 1 tablet by mouth every 8 hours as needed for Anxiety for up to 30 days.    oxyCODONE (ROXICODONE) 5 MG immediate release tablet 90 tablet 0     Sig: Take 1 tablet by mouth every 8 hours as needed for Pain for up to 30 days. Max Daily Amount: 15 mg

## 2024-03-08 DIAGNOSIS — F41.9 ANXIETY: ICD-10-CM

## 2024-03-08 DIAGNOSIS — G89.29 CHRONIC MIDLINE LOW BACK PAIN WITHOUT SCIATICA: ICD-10-CM

## 2024-03-08 DIAGNOSIS — M54.50 CHRONIC MIDLINE LOW BACK PAIN WITHOUT SCIATICA: ICD-10-CM

## 2024-03-08 RX ORDER — LORAZEPAM 0.5 MG/1
0.5 TABLET ORAL EVERY 8 HOURS PRN
Qty: 90 TABLET | Refills: 0 | Status: SHIPPED | OUTPATIENT
Start: 2024-03-08 | End: 2024-04-07

## 2024-03-08 RX ORDER — OXYCODONE HYDROCHLORIDE 5 MG/1
5 TABLET ORAL EVERY 8 HOURS PRN
Qty: 90 TABLET | Refills: 0 | Status: SHIPPED | OUTPATIENT
Start: 2024-03-08 | End: 2024-04-07

## 2024-03-08 NOTE — TELEPHONE ENCOUNTER
----- Message from Laura Penny sent at 3/7/2024 11:04 AM EST -----  Subject: Refill Request    QUESTIONS  Name of Medication? oxyCODONE (ROXICODONE) 5 MG immediate release tablet  Patient-reported dosage and instructions? Take 1 tablet by mouth every 8   hours as needed for Pain for up to 30 days. Max Daily Amount? 15 mg  How many days do you have left? 1  Preferred Pharmacy? [id number]  Pharmacy phone number (if available)? [phone]  ---------------------------------------------------------------------------  --------------,  Name of Medication? LORazepam (ATIVAN) 0.5 MG tablet  Patient-reported dosage and instructions? Take 1 tablet by mouth every 8   hours as needed for Anxiety for up to 30 days.  How many days do you have left? 2  Preferred Pharmacy? [id number]  Pharmacy phone number (if available)? [phone]  ---------------------------------------------------------------------------  --------------  CALL BACK INFO  What is the best way for the office to contact you? OK to leave message on   voicemail  Preferred Call Back Phone Number? [phone]  ---------------------------------------------------------------------------  --------------  SCRIPT ANSWERS  Relationship to Patient? Self   Thank you for choosing Sheila Walls MD at [place] [address]  To Do: Kathie Isaac  1. Please have labs as ordered  2.  Please stop Phentermine, Metformin, Jardiance  Effective 6/19/17 until November 2017  Due to [employer] is being mov or side effects or medication interactions.  It is your duty and for your safety to discuss with the pharmacist and our office with questions, and to notify us and stop treatment if problems arise, but know that our intention is that the benefits outweigh

## 2024-03-14 ENCOUNTER — OFFICE VISIT (OUTPATIENT)
Dept: FAMILY MEDICINE CLINIC | Age: 69
End: 2024-03-14
Payer: COMMERCIAL

## 2024-03-14 ENCOUNTER — HOSPITAL ENCOUNTER (OUTPATIENT)
Age: 69
Setting detail: SPECIMEN
Discharge: HOME | End: 2024-03-14

## 2024-03-14 VITALS
WEIGHT: 170.4 LBS | SYSTOLIC BLOOD PRESSURE: 136 MMHG | HEIGHT: 66 IN | BODY MASS INDEX: 27.38 KG/M2 | HEART RATE: 79 BPM | DIASTOLIC BLOOD PRESSURE: 84 MMHG

## 2024-03-14 DIAGNOSIS — N18.31 STAGE 3A CHRONIC KIDNEY DISEASE (HCC): ICD-10-CM

## 2024-03-14 DIAGNOSIS — I10 ESSENTIAL HYPERTENSION: ICD-10-CM

## 2024-03-14 DIAGNOSIS — G89.29 CHRONIC MIDLINE LOW BACK PAIN WITHOUT SCIATICA: Primary | ICD-10-CM

## 2024-03-14 DIAGNOSIS — F41.9 ANXIETY: ICD-10-CM

## 2024-03-14 DIAGNOSIS — E55.9 VITAMIN D DEFICIENCY: ICD-10-CM

## 2024-03-14 DIAGNOSIS — R04.0 EPISTAXIS: ICD-10-CM

## 2024-03-14 DIAGNOSIS — E78.2 MIXED HYPERLIPIDEMIA: ICD-10-CM

## 2024-03-14 DIAGNOSIS — N28.9 RENAL INSUFFICIENCY: ICD-10-CM

## 2024-03-14 DIAGNOSIS — M54.50 CHRONIC MIDLINE LOW BACK PAIN WITHOUT SCIATICA: Primary | ICD-10-CM

## 2024-03-14 DIAGNOSIS — R73.09 ABNORMAL GLUCOSE: ICD-10-CM

## 2024-03-14 LAB
25(OH)D3 SERPL-MCNC: 34.9 NG/ML (ref 30–100)
ALBUMIN SERPL-MCNC: 4.5 G/DL (ref 3.5–5.2)
ALBUMIN/GLOB SERPL: 2 {RATIO} (ref 1–2.5)
ALP SERPL-CCNC: 93 U/L (ref 35–104)
ALT SERPL-CCNC: 27 U/L (ref 10–35)
ANION GAP SERPL CALCULATED.3IONS-SCNC: 12 MMOL/L (ref 9–16)
AST SERPL-CCNC: 35 U/L (ref 10–35)
BASOPHILS # BLD: 0.06 K/UL (ref 0–0.2)
BASOPHILS NFR BLD: 1 % (ref 0–2)
BILIRUB SERPL-MCNC: 1.1 MG/DL (ref 0–1.2)
BUN SERPL-MCNC: 17 MG/DL (ref 8–23)
CALCIUM SERPL-MCNC: 9.6 MG/DL (ref 8.6–10.4)
CHLORIDE SERPL-SCNC: 104 MMOL/L (ref 98–107)
CHOLEST SERPL-MCNC: 194 MG/DL (ref 0–199)
CHOLESTEROL/HDL RATIO: 5
CO2 SERPL-SCNC: 24 MMOL/L (ref 20–31)
CREAT SERPL-MCNC: 1.1 MG/DL (ref 0.5–0.9)
EOSINOPHIL # BLD: 0.53 K/UL (ref 0–0.44)
EOSINOPHILS RELATIVE PERCENT: 7 % (ref 1–4)
ERYTHROCYTE [DISTWIDTH] IN BLOOD BY AUTOMATED COUNT: 13.2 % (ref 11.8–14.4)
EST. AVERAGE GLUCOSE BLD GHB EST-MCNC: 117 MG/DL
GFR SERPL CREATININE-BSD FRML MDRD: 52 ML/MIN/1.73M2
GLUCOSE SERPL-MCNC: 104 MG/DL (ref 74–99)
HBA1C MFR BLD: 5.7 % (ref 4–6)
HCT VFR BLD AUTO: 44.9 % (ref 36.3–47.1)
HDLC SERPL-MCNC: 42 MG/DL
HGB BLD-MCNC: 14.5 G/DL (ref 11.9–15.1)
IMM GRANULOCYTES # BLD AUTO: <0.03 K/UL (ref 0–0.3)
IMM GRANULOCYTES NFR BLD: 0 %
LDLC SERPL CALC-MCNC: 127 MG/DL (ref 0–100)
LYMPHOCYTES NFR BLD: 1.11 K/UL (ref 1.1–3.7)
LYMPHOCYTES RELATIVE PERCENT: 15 % (ref 24–43)
MCH RBC QN AUTO: 27.2 PG (ref 25.2–33.5)
MCHC RBC AUTO-ENTMCNC: 32.3 G/DL (ref 28.4–34.8)
MCV RBC AUTO: 84.2 FL (ref 82.6–102.9)
MONOCYTES NFR BLD: 0.67 K/UL (ref 0.1–1.2)
MONOCYTES NFR BLD: 9 % (ref 3–12)
NEUTROPHILS NFR BLD: 68 % (ref 36–65)
NEUTS SEG NFR BLD: 4.8 K/UL (ref 1.5–8.1)
NRBC BLD-RTO: 0 PER 100 WBC
PLATELET # BLD AUTO: 316 K/UL (ref 138–453)
PMV BLD AUTO: 10.9 FL (ref 8.1–13.5)
POTASSIUM SERPL-SCNC: 4.4 MMOL/L (ref 3.7–5.3)
PROT SERPL-MCNC: 7.5 G/DL (ref 6.6–8.7)
RBC # BLD AUTO: 5.33 M/UL (ref 3.95–5.11)
SODIUM SERPL-SCNC: 140 MMOL/L (ref 136–145)
TRIGL SERPL-MCNC: 129 MG/DL
VLDLC SERPL CALC-MCNC: 26 MG/DL
WBC OTHER # BLD: 7.2 K/UL (ref 3.5–11.3)

## 2024-03-14 PROCEDURE — 99214 OFFICE O/P EST MOD 30 MIN: CPT | Performed by: FAMILY MEDICINE

## 2024-03-14 PROCEDURE — 3079F DIAST BP 80-89 MM HG: CPT | Performed by: FAMILY MEDICINE

## 2024-03-14 PROCEDURE — 3075F SYST BP GE 130 - 139MM HG: CPT | Performed by: FAMILY MEDICINE

## 2024-03-14 PROCEDURE — 1123F ACP DISCUSS/DSCN MKR DOCD: CPT | Performed by: FAMILY MEDICINE

## 2024-03-14 RX ORDER — TIZANIDINE 4 MG/1
4 TABLET ORAL 4 TIMES DAILY PRN
Qty: 40 TABLET | Refills: 0 | Status: SHIPPED | OUTPATIENT
Start: 2024-03-14

## 2024-03-14 ASSESSMENT — PATIENT HEALTH QUESTIONNAIRE - PHQ9
1. LITTLE INTEREST OR PLEASURE IN DOING THINGS: 0
SUM OF ALL RESPONSES TO PHQ QUESTIONS 1-9: 0
SUM OF ALL RESPONSES TO PHQ QUESTIONS 1-9: 0
2. FEELING DOWN, DEPRESSED OR HOPELESS: 0
SUM OF ALL RESPONSES TO PHQ QUESTIONS 1-9: 0
SUM OF ALL RESPONSES TO PHQ9 QUESTIONS 1 & 2: 0
SUM OF ALL RESPONSES TO PHQ QUESTIONS 1-9: 0

## 2024-03-14 ASSESSMENT — ENCOUNTER SYMPTOMS
BLOOD IN STOOL: 0
COUGH: 0
EYES NEGATIVE: 1
SHORTNESS OF BREATH: 0
BACK PAIN: 1
CONSTIPATION: 0
ABDOMINAL PAIN: 0
DIARRHEA: 0
ALLERGIC/IMMUNOLOGIC NEGATIVE: 1

## 2024-03-14 NOTE — PROGRESS NOTES
Affect: Mood normal.         Behavior: Behavior normal.         Thought Content: Thought content normal.         Judgment: Judgment normal.         ASSESSMENT/PLAN  1. Chronic midline low back pain without sciatica  Trial of increase dose of oxycodone, try zanaflex especially at night.   - tiZANidine (ZANAFLEX) 4 MG tablet; Take 1 tablet by mouth 4 times daily as needed (muscle spasm)  Dispense: 40 tablet; Refill: 0  - oxyCODONE HCl 7.5 MG TABS; Take 7.5 mg by mouth every 8 hours as needed (back pain). Max Daily Amount: 22.5 mg  Dispense: 90 tablet; Refill: 0    2. Essential hypertension  Chronic, stable, continue current medication and/or plan  Get labs done that were ordered previously.     3. Vitamin D deficiency  Check labs and adjust if indicated    4. Anxiety  Stable on ativan, cont current dose.     5. Mixed hyperlipidemia  Get labs done, treat if indicated    6. Stage 3a chronic kidney disease (HCC)  Stay hydrated, recheck labs.        Donna received counseling on the following healthy behaviors: nutrition, exercise, and medication adherence  Reviewed prior labs and health maintenance.  Continue current medications, diet and exercise.  Discussed use, benefit, and side effects of prescribed medications. Barriers to medication compliance addressed.   Patient given educational materials - see patient instructions.    All patient questions answered.  Patient voiced understanding.      Return in about 3 months (around 6/14/2024) for anxiety, chronic pain.        Electronically signed by Maryam Schofield MD on 3/14/24 at 9:23 AM EDT

## 2024-03-21 ENCOUNTER — TELEPHONE (OUTPATIENT)
Dept: FAMILY MEDICINE CLINIC | Age: 69
End: 2024-03-21

## 2024-03-21 DIAGNOSIS — M54.50 CHRONIC MIDLINE LOW BACK PAIN WITHOUT SCIATICA: ICD-10-CM

## 2024-03-21 DIAGNOSIS — G89.29 CHRONIC MIDLINE LOW BACK PAIN WITHOUT SCIATICA: ICD-10-CM

## 2024-03-21 RX ORDER — OXYCODONE HYDROCHLORIDE 5 MG/1
7.5 TABLET ORAL EVERY 8 HOURS PRN
Qty: 135 TABLET | Refills: 0 | Status: SHIPPED | OUTPATIENT
Start: 2024-03-21 | End: 2024-04-20

## 2024-03-21 NOTE — TELEPHONE ENCOUNTER
Refill problems for: oxyCODONE HCl 7.5 MG TABS     This pharmacy is not able to get this:    Pharmacy    [id number] - [place], [place] - [address] - P [phone] - F [phone] [address] [place] [address]  Phone: [phone]  Fax: [phone]     What else can the pt do?

## 2024-03-30 DIAGNOSIS — F32.9 REACTIVE DEPRESSION: ICD-10-CM

## 2024-04-01 RX ORDER — CITALOPRAM 20 MG/1
20 TABLET ORAL DAILY
Qty: 90 TABLET | Refills: 3 | Status: SHIPPED | OUTPATIENT
Start: 2024-04-01

## 2024-05-01 DIAGNOSIS — M54.50 CHRONIC MIDLINE LOW BACK PAIN WITHOUT SCIATICA: ICD-10-CM

## 2024-05-01 DIAGNOSIS — F41.9 ANXIETY: ICD-10-CM

## 2024-05-01 DIAGNOSIS — G89.29 CHRONIC MIDLINE LOW BACK PAIN WITHOUT SCIATICA: ICD-10-CM

## 2024-05-01 RX ORDER — LORAZEPAM 0.5 MG/1
0.5 TABLET ORAL EVERY 8 HOURS PRN
Qty: 90 TABLET | Refills: 0 | Status: SHIPPED | OUTPATIENT
Start: 2024-05-01 | End: 2024-05-31

## 2024-05-01 RX ORDER — OXYCODONE HYDROCHLORIDE 5 MG/1
7.5 TABLET ORAL EVERY 8 HOURS PRN
Qty: 135 TABLET | Refills: 0 | Status: SHIPPED | OUTPATIENT
Start: 2024-05-01 | End: 2024-05-31

## 2024-05-01 NOTE — TELEPHONE ENCOUNTER
Donna Zeng is calling to request a refill on the following medication(s):    Last Visit Date (If Applicable):  3/14/2024    Next Visit Date:    6/13/2024    Medication Request:  Requested Prescriptions     Pending Prescriptions Disp Refills    oxyCODONE (ROXICODONE) 5 MG immediate release tablet 135 tablet 0     Sig: Take 1.5 tablets by mouth every 8 hours as needed for Pain for up to 30 days. Max Daily Amount: 22.5 mg    LORazepam (ATIVAN) 0.5 MG tablet 90 tablet 0     Sig: Take 1 tablet by mouth every 8 hours as needed for Anxiety for up to 30 days.

## 2024-05-06 DIAGNOSIS — G89.29 CHRONIC MIDLINE LOW BACK PAIN WITHOUT SCIATICA: ICD-10-CM

## 2024-05-06 DIAGNOSIS — M54.50 CHRONIC MIDLINE LOW BACK PAIN WITHOUT SCIATICA: ICD-10-CM

## 2024-05-06 RX ORDER — TIZANIDINE 4 MG/1
4 TABLET ORAL 4 TIMES DAILY PRN
Qty: 40 TABLET | Refills: 0 | Status: SHIPPED | OUTPATIENT
Start: 2024-05-06

## 2024-05-06 NOTE — TELEPHONE ENCOUNTER
Donna Zeng is calling to request a refill on the following medication(s):    Last Visit Date (If Applicable):  3/14/2024    Next Visit Date:    6/13/2024    Medication Request:  Requested Prescriptions     Pending Prescriptions Disp Refills    tiZANidine (ZANAFLEX) 4 MG tablet 40 tablet 0     Sig: Take 1 tablet by mouth 4 times daily as needed (muscle spasm)

## 2024-06-05 DIAGNOSIS — F41.9 ANXIETY: ICD-10-CM

## 2024-06-05 DIAGNOSIS — M54.50 CHRONIC MIDLINE LOW BACK PAIN WITHOUT SCIATICA: ICD-10-CM

## 2024-06-05 DIAGNOSIS — G89.29 CHRONIC MIDLINE LOW BACK PAIN WITHOUT SCIATICA: ICD-10-CM

## 2024-06-05 NOTE — TELEPHONE ENCOUNTER
Donna Zeng is calling to request a refill on the following medication(s):    Last Visit Date (If Applicable):  3/14/2024    Next Visit Date:    6/13/2024    Medication Request:  Requested Prescriptions     Pending Prescriptions Disp Refills    tiZANidine (ZANAFLEX) 4 MG tablet 40 tablet 0     Sig: Take 1 tablet by mouth 4 times daily as needed (muscle spasm)    LORazepam (ATIVAN) 0.5 MG tablet 60 tablet 0     Sig: Take 1 tablet by mouth every 8 hours as needed for Anxiety for up to 30 days.    oxyCODONE (ROXICODONE) 5 MG immediate release tablet 135 tablet 0     Sig: Take 1.5 tablets by mouth every 8 hours as needed for Pain for up to 30 days. Max Daily Amount: 22.5 mg

## 2024-06-06 RX ORDER — OXYCODONE HYDROCHLORIDE 5 MG/1
7.5 TABLET ORAL EVERY 8 HOURS PRN
Qty: 135 TABLET | Refills: 0 | Status: SHIPPED | OUTPATIENT
Start: 2024-06-06 | End: 2024-07-06

## 2024-06-06 RX ORDER — TIZANIDINE 4 MG/1
4 TABLET ORAL 4 TIMES DAILY PRN
Qty: 40 TABLET | Refills: 0 | Status: SHIPPED | OUTPATIENT
Start: 2024-06-06

## 2024-06-06 RX ORDER — LORAZEPAM 0.5 MG/1
0.5 TABLET ORAL EVERY 8 HOURS PRN
Qty: 90 TABLET | Refills: 0 | Status: SHIPPED | OUTPATIENT
Start: 2024-06-06 | End: 2024-07-06

## 2024-06-13 ENCOUNTER — TELEPHONE (OUTPATIENT)
Dept: FAMILY MEDICINE CLINIC | Age: 69
End: 2024-06-13

## 2024-06-13 NOTE — TELEPHONE ENCOUNTER
Patient is on the way to the er with another episode. Wanted to inform provider. Dr Schofield was notified

## 2024-06-18 ENCOUNTER — TELEPHONE (OUTPATIENT)
Dept: FAMILY MEDICINE CLINIC | Age: 69
End: 2024-06-18

## 2024-06-18 NOTE — TELEPHONE ENCOUNTER
Patient sister Carole called in stating she is very concerned about the patient she mentioned the patient is sick and throwing up and she is feeling very sick she went to the hospital and she is just not feeling any better and she is wanting to know what can she do to feel better       Please advise

## 2024-07-03 ENCOUNTER — OFFICE VISIT (OUTPATIENT)
Dept: FAMILY MEDICINE CLINIC | Age: 69
End: 2024-07-03
Payer: COMMERCIAL

## 2024-07-03 VITALS
DIASTOLIC BLOOD PRESSURE: 80 MMHG | SYSTOLIC BLOOD PRESSURE: 134 MMHG | HEART RATE: 91 BPM | BODY MASS INDEX: 27.15 KG/M2 | WEIGHT: 168.2 LBS

## 2024-07-03 DIAGNOSIS — H83.03 LABYRINTHITIS OF BOTH EARS: ICD-10-CM

## 2024-07-03 DIAGNOSIS — G89.29 CHRONIC MIDLINE LOW BACK PAIN WITHOUT SCIATICA: ICD-10-CM

## 2024-07-03 DIAGNOSIS — I10 ESSENTIAL HYPERTENSION: ICD-10-CM

## 2024-07-03 DIAGNOSIS — R42 VERTIGO: Primary | ICD-10-CM

## 2024-07-03 DIAGNOSIS — F41.9 ANXIETY: ICD-10-CM

## 2024-07-03 DIAGNOSIS — N18.31 STAGE 3A CHRONIC KIDNEY DISEASE (HCC): ICD-10-CM

## 2024-07-03 DIAGNOSIS — K76.0 HEPATIC STEATOSIS: ICD-10-CM

## 2024-07-03 DIAGNOSIS — M54.50 CHRONIC MIDLINE LOW BACK PAIN WITHOUT SCIATICA: ICD-10-CM

## 2024-07-03 PROBLEM — N18.30 CKD (CHRONIC KIDNEY DISEASE) STAGE 3, GFR 30-59 ML/MIN (HCC): Status: ACTIVE | Noted: 2024-07-03

## 2024-07-03 PROCEDURE — 1123F ACP DISCUSS/DSCN MKR DOCD: CPT | Performed by: FAMILY MEDICINE

## 2024-07-03 PROCEDURE — 99214 OFFICE O/P EST MOD 30 MIN: CPT | Performed by: FAMILY MEDICINE

## 2024-07-03 PROCEDURE — 3075F SYST BP GE 130 - 139MM HG: CPT | Performed by: FAMILY MEDICINE

## 2024-07-03 PROCEDURE — 3079F DIAST BP 80-89 MM HG: CPT | Performed by: FAMILY MEDICINE

## 2024-07-03 RX ORDER — LORAZEPAM 0.5 MG/1
0.5 TABLET ORAL EVERY 8 HOURS PRN
Qty: 90 TABLET | Refills: 0 | Status: SHIPPED | OUTPATIENT
Start: 2024-07-03 | End: 2024-08-02

## 2024-07-03 RX ORDER — OXYCODONE HYDROCHLORIDE 5 MG/1
7.5 TABLET ORAL EVERY 8 HOURS PRN
Qty: 135 TABLET | Refills: 0 | Status: SHIPPED | OUTPATIENT
Start: 2024-07-03 | End: 2024-08-02

## 2024-07-03 RX ORDER — MECLIZINE HYDROCHLORIDE 25 MG/1
25 TABLET ORAL 3 TIMES DAILY PRN
Qty: 30 TABLET | Refills: 1 | Status: SHIPPED | OUTPATIENT
Start: 2024-07-03

## 2024-07-03 RX ORDER — MECLIZINE HYDROCHLORIDE 25 MG/1
25 TABLET ORAL 3 TIMES DAILY PRN
COMMUNITY
Start: 2024-06-13 | End: 2024-07-03 | Stop reason: SDUPTHER

## 2024-07-03 RX ORDER — ONDANSETRON 4 MG/1
4 TABLET, ORALLY DISINTEGRATING ORAL EVERY 8 HOURS PRN
Qty: 30 TABLET | Refills: 1 | Status: SHIPPED | OUTPATIENT
Start: 2024-07-03

## 2024-07-03 RX ORDER — AMOXICILLIN 500 MG/1
500 CAPSULE ORAL 2 TIMES DAILY
Qty: 14 CAPSULE | Refills: 0 | Status: SHIPPED | OUTPATIENT
Start: 2024-07-03 | End: 2024-07-10

## 2024-07-03 RX ORDER — ONDANSETRON 4 MG/1
4 TABLET, ORALLY DISINTEGRATING ORAL EVERY 8 HOURS PRN
COMMUNITY
Start: 2024-06-13 | End: 2024-07-03 | Stop reason: SDUPTHER

## 2024-07-03 SDOH — ECONOMIC STABILITY: FOOD INSECURITY: WITHIN THE PAST 12 MONTHS, YOU WORRIED THAT YOUR FOOD WOULD RUN OUT BEFORE YOU GOT MONEY TO BUY MORE.: NEVER TRUE

## 2024-07-03 SDOH — ECONOMIC STABILITY: FOOD INSECURITY: WITHIN THE PAST 12 MONTHS, THE FOOD YOU BOUGHT JUST DIDN'T LAST AND YOU DIDN'T HAVE MONEY TO GET MORE.: NEVER TRUE

## 2024-07-03 SDOH — ECONOMIC STABILITY: INCOME INSECURITY: HOW HARD IS IT FOR YOU TO PAY FOR THE VERY BASICS LIKE FOOD, HOUSING, MEDICAL CARE, AND HEATING?: NOT HARD AT ALL

## 2024-07-03 ASSESSMENT — ENCOUNTER SYMPTOMS
DIARRHEA: 0
EYES NEGATIVE: 1
ABDOMINAL PAIN: 0
BACK PAIN: 1
CONSTIPATION: 0
COUGH: 0
SHORTNESS OF BREATH: 0
ALLERGIC/IMMUNOLOGIC NEGATIVE: 1
BLOOD IN STOOL: 0

## 2024-07-03 NOTE — PROGRESS NOTES
ears  Discussed trial of abx. Follow up with PT and neuro  - amoxicillin (AMOXIL) 500 MG capsule; Take 1 capsule by mouth 2 times daily for 7 days  Dispense: 14 capsule; Refill: 0       Donna received counseling on the following healthy behaviors: nutrition, exercise, and medication adherence  Reviewed prior labs and health maintenance.  Continue current medications, diet and exercise.  Discussed use, benefit, and side effects of prescribed medications. Barriers to medication compliance addressed.   Patient given educational materials - see patient instructions.    All patient questions answered.  Patient voiced understanding.      Return in about 3 months (around 10/3/2024) for chronic pain, anxiety.        Electronically signed by Maryam Schofield MD on 7/3/24 at 8:36 AM EDT

## 2024-08-05 DIAGNOSIS — M54.50 CHRONIC MIDLINE LOW BACK PAIN WITHOUT SCIATICA: ICD-10-CM

## 2024-08-05 DIAGNOSIS — H83.03 LABYRINTHITIS OF BOTH EARS: ICD-10-CM

## 2024-08-05 DIAGNOSIS — F41.9 ANXIETY: ICD-10-CM

## 2024-08-05 DIAGNOSIS — G89.29 CHRONIC MIDLINE LOW BACK PAIN WITHOUT SCIATICA: ICD-10-CM

## 2024-08-05 RX ORDER — LORAZEPAM 0.5 MG/1
0.5 TABLET ORAL EVERY 8 HOURS PRN
Qty: 90 TABLET | Refills: 0 | Status: SHIPPED | OUTPATIENT
Start: 2024-08-05 | End: 2024-09-04

## 2024-08-05 RX ORDER — TIZANIDINE 4 MG/1
4 TABLET ORAL 4 TIMES DAILY PRN
Qty: 40 TABLET | Refills: 0 | Status: SHIPPED | OUTPATIENT
Start: 2024-08-05

## 2024-08-05 NOTE — TELEPHONE ENCOUNTER
Donna Zeng is calling to request a refill on the following medication(s):    Last Visit Date (If Applicable):  7/3/2024    Next Visit Date:    10/8/2024    Medication Request:  Requested Prescriptions     Pending Prescriptions Disp Refills    oxyCODONE (ROXICODONE) 5 MG immediate release tablet 135 tablet 0     Sig: Take 1.5 tablets by mouth every 8 hours as needed for Pain for up to 30 days. Max Daily Amount: 22.5 mg    LORazepam (ATIVAN) 0.5 MG tablet 90 tablet 0     Sig: Take 1 tablet by mouth every 8 hours as needed for Anxiety for up to 30 days.    tiZANidine (ZANAFLEX) 4 MG tablet 40 tablet 0     Sig: Take 1 tablet by mouth 4 times daily as needed (muscle spasm)

## 2024-08-07 RX ORDER — OXYCODONE HYDROCHLORIDE 5 MG/1
7.5 TABLET ORAL EVERY 8 HOURS PRN
Qty: 135 TABLET | Refills: 0 | Status: SHIPPED | OUTPATIENT
Start: 2024-08-07 | End: 2024-09-06

## 2024-08-09 ENCOUNTER — TELEPHONE (OUTPATIENT)
Dept: FAMILY MEDICINE CLINIC | Age: 69
End: 2024-08-09

## 2024-08-09 NOTE — TELEPHONE ENCOUNTER
Please thank the pharmacy for calling.  I am aware.  It appears that . her PCP has also been feeling the tizanidine since March with her oxycodone and lorazepam

## 2024-08-09 NOTE — TELEPHONE ENCOUNTER
[facility] called to make sure that provider is aware of possible drug interactions of lorazepam, tizanidine and oxycodone prescribed , would like to know if provider is okay with patient receiving all three medications.

## 2024-09-06 DIAGNOSIS — G89.29 CHRONIC MIDLINE LOW BACK PAIN WITHOUT SCIATICA: ICD-10-CM

## 2024-09-06 DIAGNOSIS — M54.50 CHRONIC MIDLINE LOW BACK PAIN WITHOUT SCIATICA: ICD-10-CM

## 2024-09-06 DIAGNOSIS — F41.9 ANXIETY: ICD-10-CM

## 2024-09-06 RX ORDER — OXYCODONE HYDROCHLORIDE 5 MG/1
7.5 TABLET ORAL EVERY 8 HOURS PRN
Qty: 135 TABLET | Refills: 0 | Status: SHIPPED | OUTPATIENT
Start: 2024-09-06 | End: 2024-10-06

## 2024-09-06 RX ORDER — LORAZEPAM 0.5 MG/1
0.5 TABLET ORAL EVERY 8 HOURS PRN
Qty: 90 TABLET | Refills: 0 | Status: SHIPPED | OUTPATIENT
Start: 2024-09-06 | End: 2024-10-06

## 2024-09-06 NOTE — TELEPHONE ENCOUNTER
Donna Zeng is calling to request a refill on the following medication(s):    Last Visit Date (If Applicable):  7/3/2024    Next Visit Date:    10/8/2024    Medication Request:  Requested Prescriptions     Pending Prescriptions Disp Refills    oxyCODONE (ROXICODONE) 5 MG immediate release tablet 135 tablet 0     Sig: Take 1.5 tablets by mouth every 8 hours as needed for Pain for up to 30 days. Max Daily Amount: 22.5 mg    tiZANidine (ZANAFLEX) 4 MG tablet 40 tablet 0     Sig: Take 1 tablet by mouth 4 times daily as needed (muscle spasm)    LORazepam (ATIVAN) 0.5 MG tablet 90 tablet 0     Sig: Take 1 tablet by mouth every 8 hours as needed for Anxiety for up to 30 days.

## 2024-10-08 ENCOUNTER — OFFICE VISIT (OUTPATIENT)
Dept: FAMILY MEDICINE CLINIC | Age: 69
End: 2024-10-08
Payer: COMMERCIAL

## 2024-10-08 VITALS
HEART RATE: 86 BPM | WEIGHT: 171.6 LBS | BODY MASS INDEX: 27.7 KG/M2 | SYSTOLIC BLOOD PRESSURE: 130 MMHG | DIASTOLIC BLOOD PRESSURE: 92 MMHG

## 2024-10-08 DIAGNOSIS — E78.2 MIXED HYPERLIPIDEMIA: ICD-10-CM

## 2024-10-08 DIAGNOSIS — M54.50 CHRONIC MIDLINE LOW BACK PAIN WITHOUT SCIATICA: Primary | ICD-10-CM

## 2024-10-08 DIAGNOSIS — I49.3 PVC (PREMATURE VENTRICULAR CONTRACTION): ICD-10-CM

## 2024-10-08 DIAGNOSIS — F41.9 ANXIETY: ICD-10-CM

## 2024-10-08 DIAGNOSIS — I10 PRIMARY HYPERTENSION: ICD-10-CM

## 2024-10-08 DIAGNOSIS — G89.29 CHRONIC MIDLINE LOW BACK PAIN WITHOUT SCIATICA: Primary | ICD-10-CM

## 2024-10-08 PROCEDURE — 3075F SYST BP GE 130 - 139MM HG: CPT | Performed by: FAMILY MEDICINE

## 2024-10-08 PROCEDURE — 3080F DIAST BP >= 90 MM HG: CPT | Performed by: FAMILY MEDICINE

## 2024-10-08 PROCEDURE — 1123F ACP DISCUSS/DSCN MKR DOCD: CPT | Performed by: FAMILY MEDICINE

## 2024-10-08 PROCEDURE — 99214 OFFICE O/P EST MOD 30 MIN: CPT | Performed by: FAMILY MEDICINE

## 2024-10-08 RX ORDER — LORAZEPAM 0.5 MG/1
0.5 TABLET ORAL EVERY 6 HOURS PRN
COMMUNITY
End: 2024-10-08 | Stop reason: SDUPTHER

## 2024-10-08 RX ORDER — OXYCODONE AND ACETAMINOPHEN 5; 325 MG/1; MG/1
1 TABLET ORAL EVERY 4 HOURS PRN
Qty: 120 TABLET | Refills: 0 | Status: SHIPPED | OUTPATIENT
Start: 2024-10-08 | End: 2024-11-07

## 2024-10-08 RX ORDER — LORAZEPAM 0.5 MG/1
0.5 TABLET ORAL EVERY 6 HOURS PRN
Qty: 90 TABLET | Refills: 0 | Status: SHIPPED | OUTPATIENT
Start: 2024-10-08 | End: 2024-11-07

## 2024-10-08 RX ORDER — OXYCODONE AND ACETAMINOPHEN 5; 325 MG/1; MG/1
1 TABLET ORAL EVERY 4 HOURS PRN
COMMUNITY
End: 2024-10-08 | Stop reason: SDUPTHER

## 2024-10-08 ASSESSMENT — ENCOUNTER SYMPTOMS
DIARRHEA: 0
COUGH: 0
ABDOMINAL PAIN: 0
BLOOD IN STOOL: 0
CONSTIPATION: 0
EYES NEGATIVE: 1
SHORTNESS OF BREATH: 0
BACK PAIN: 1
ALLERGIC/IMMUNOLOGIC NEGATIVE: 1

## 2024-10-08 NOTE — PROGRESS NOTES
today.       Donna received counseling on the following healthy behaviors: nutrition, exercise, and medication adherence  Reviewed prior labs and health maintenance.  Continue current medications, diet and exercise.  Discussed use, benefit, and side effects of prescribed medications. Barriers to medication compliance addressed.   Patient given educational materials - see patient instructions.    All patient questions answered.  Patient voiced understanding.      Return in about 3 months (around 1/8/2025) for chronic pain, anxiety.        Electronically signed by Maryam Schofield MD on 10/8/24 at 9:05 AM EDT

## 2024-11-07 DIAGNOSIS — M54.50 CHRONIC MIDLINE LOW BACK PAIN WITHOUT SCIATICA: ICD-10-CM

## 2024-11-07 DIAGNOSIS — F41.9 ANXIETY: ICD-10-CM

## 2024-11-07 DIAGNOSIS — G89.29 CHRONIC MIDLINE LOW BACK PAIN WITHOUT SCIATICA: ICD-10-CM

## 2024-11-07 RX ORDER — OXYCODONE AND ACETAMINOPHEN 5; 325 MG/1; MG/1
1 TABLET ORAL EVERY 4 HOURS PRN
Qty: 135 TABLET | Refills: 0 | Status: SHIPPED | OUTPATIENT
Start: 2024-11-07 | End: 2024-12-07

## 2024-11-07 RX ORDER — LORAZEPAM 0.5 MG/1
0.5 TABLET ORAL EVERY 6 HOURS PRN
Qty: 90 TABLET | Refills: 0 | Status: SHIPPED | OUTPATIENT
Start: 2024-11-07 | End: 2024-12-07

## 2024-11-07 NOTE — TELEPHONE ENCOUNTER
Donna Zeng is calling to request a refill on the following medication(s):    Last Visit Date (If Applicable):  10/8/2024    Next Visit Date:    1/7/2025    Medication Request:  Requested Prescriptions     Pending Prescriptions Disp Refills    LORazepam (ATIVAN) 0.5 MG tablet 90 tablet 0     Sig: Take 1 tablet by mouth every 6 hours as needed for Anxiety for up to 30 days. Max Daily Amount: 2 mg    oxyCODONE-acetaminophen (PERCOCET) 5-325 MG per tablet 135 tablet 0     Sig: Take 1 tablet by mouth every 4 hours as needed for Pain for up to 30 days. Max Daily Amount: 6 tablets

## 2024-12-09 DIAGNOSIS — G89.29 CHRONIC MIDLINE LOW BACK PAIN WITHOUT SCIATICA: ICD-10-CM

## 2024-12-09 DIAGNOSIS — M54.50 CHRONIC MIDLINE LOW BACK PAIN WITHOUT SCIATICA: ICD-10-CM

## 2024-12-09 NOTE — TELEPHONE ENCOUNTER
Pt need oxycodone percocet 5-325 mg   Lorazepam ativan 0.5 mg   Which both is not on her med list please advise

## 2024-12-10 ENCOUNTER — TELEPHONE (OUTPATIENT)
Dept: FAMILY MEDICINE CLINIC | Age: 69
End: 2024-12-10

## 2024-12-10 DIAGNOSIS — F41.9 ANXIETY: ICD-10-CM

## 2024-12-10 DIAGNOSIS — G89.29 CHRONIC MIDLINE LOW BACK PAIN WITHOUT SCIATICA: ICD-10-CM

## 2024-12-10 DIAGNOSIS — M54.50 CHRONIC MIDLINE LOW BACK PAIN WITHOUT SCIATICA: ICD-10-CM

## 2024-12-10 RX ORDER — OXYCODONE AND ACETAMINOPHEN 5; 325 MG/1; MG/1
1 TABLET ORAL EVERY 4 HOURS PRN
Qty: 135 TABLET | Refills: 0 | Status: SHIPPED | OUTPATIENT
Start: 2024-12-10 | End: 2025-01-09

## 2024-12-10 RX ORDER — LORAZEPAM 0.5 MG/1
0.5 TABLET ORAL EVERY 6 HOURS PRN
Qty: 90 TABLET | Refills: 0 | Status: SHIPPED | OUTPATIENT
Start: 2024-12-10 | End: 2025-01-09

## 2024-12-10 NOTE — TELEPHONE ENCOUNTER
Pt called back today about these med's Pt need oxycodone percocet 5-325 mg   Lorazepam ativan 0.5 mg   Which both is not on her med list please advise

## 2025-01-07 ENCOUNTER — OFFICE VISIT (OUTPATIENT)
Dept: FAMILY MEDICINE CLINIC | Age: 70
End: 2025-01-07
Payer: COMMERCIAL

## 2025-01-07 VITALS
SYSTOLIC BLOOD PRESSURE: 132 MMHG | WEIGHT: 173.8 LBS | BODY MASS INDEX: 28.05 KG/M2 | DIASTOLIC BLOOD PRESSURE: 88 MMHG | HEART RATE: 89 BPM

## 2025-01-07 DIAGNOSIS — M54.50 CHRONIC MIDLINE LOW BACK PAIN WITHOUT SCIATICA: Primary | ICD-10-CM

## 2025-01-07 DIAGNOSIS — G89.29 CHRONIC MIDLINE LOW BACK PAIN WITHOUT SCIATICA: Primary | ICD-10-CM

## 2025-01-07 DIAGNOSIS — F41.9 ANXIETY: ICD-10-CM

## 2025-01-07 PROCEDURE — 3079F DIAST BP 80-89 MM HG: CPT | Performed by: FAMILY MEDICINE

## 2025-01-07 PROCEDURE — 1123F ACP DISCUSS/DSCN MKR DOCD: CPT | Performed by: FAMILY MEDICINE

## 2025-01-07 PROCEDURE — 3075F SYST BP GE 130 - 139MM HG: CPT | Performed by: FAMILY MEDICINE

## 2025-01-07 PROCEDURE — 99214 OFFICE O/P EST MOD 30 MIN: CPT | Performed by: FAMILY MEDICINE

## 2025-01-07 RX ORDER — OXYCODONE AND ACETAMINOPHEN 5; 325 MG/1; MG/1
1 TABLET ORAL EVERY 4 HOURS PRN
Qty: 135 TABLET | Refills: 0 | Status: SHIPPED | OUTPATIENT
Start: 2025-01-07 | End: 2025-02-06

## 2025-01-07 RX ORDER — LORAZEPAM 0.5 MG/1
0.5 TABLET ORAL EVERY 6 HOURS PRN
Qty: 90 TABLET | Refills: 0 | Status: SHIPPED | OUTPATIENT
Start: 2025-01-07 | End: 2025-02-06

## 2025-01-07 ASSESSMENT — PATIENT HEALTH QUESTIONNAIRE - PHQ9
SUM OF ALL RESPONSES TO PHQ QUESTIONS 1-9: 0
1. LITTLE INTEREST OR PLEASURE IN DOING THINGS: NOT AT ALL
SUM OF ALL RESPONSES TO PHQ QUESTIONS 1-9: 0
SUM OF ALL RESPONSES TO PHQ9 QUESTIONS 1 & 2: 0
2. FEELING DOWN, DEPRESSED OR HOPELESS: NOT AT ALL

## 2025-01-07 NOTE — PROGRESS NOTES
[facility]  [facility] MEDICINE  [address]  [address]  [facility] [address]  Dept: [phone]    1/7/2025    CHIEF COMPLAINT    Chief Complaint   Patient presents with    Chronic Pain       HPI    Donna Zeng is a 69 y.o. female who presents   Chief Complaint   Patient presents with    Chronic Pain   .  HPI  History of Present Illness  The patient is a 69-year-old female who presents for a recheck of her chronic pain.    She reports persistent pain in both her lower and upper back, which she attributes to previous surgical interventions. She believes that her upper back bears significant stress, contributing to the discomfort. She also describes a sensation akin to something dislodging near the cage in her back, with certain areas eliciting a sharp, pin-like pain upon pressure. She does not experience any abdominal pain or leg swelling but notes daily ankle swelling, which is alleviated by elevation. Her current medication regimen includes Norco, oxycodone, and Percocet, primarily for her back pain. She is on a monthly dose of 135 oxycodone, which she finds sufficient.    She is on amlodipine for blood pressure management and has enough supply until her next visit.    She is on Celexa for her mental health and does not require refills at this time.    She is on lorazepam for her anxiety and is requesting a refill.    She is on tizanidine for her muscle spasms and is requesting a refill.    MEDICATIONS  Current: Norco, oxycodone, Percocet, lorazepam, tizanidine, amlodipine, Celexa    IMMUNIZATIONS  She declined the influenza vaccine.    Vitals:    01/07/25 0916   BP: 132/88   Pulse: 89   Weight: 78.8 kg (173 lb 12.8 oz)       REVIEW OF SYSTEMS    Review of Systems    PAST MEDICAL HISTORY    Past Medical History:   Diagnosis Date    Allergic rhinitis     Anxiety     Back pain, chronic     Broken teeth     broken front teeth from intubation during general anesthesia

## 2025-02-06 DIAGNOSIS — G89.29 CHRONIC MIDLINE LOW BACK PAIN WITHOUT SCIATICA: ICD-10-CM

## 2025-02-06 DIAGNOSIS — M54.50 CHRONIC MIDLINE LOW BACK PAIN WITHOUT SCIATICA: ICD-10-CM

## 2025-02-06 DIAGNOSIS — F41.9 ANXIETY: ICD-10-CM

## 2025-02-06 RX ORDER — OXYCODONE AND ACETAMINOPHEN 5; 325 MG/1; MG/1
1 TABLET ORAL EVERY 4 HOURS PRN
Qty: 135 TABLET | Refills: 0 | Status: SHIPPED | OUTPATIENT
Start: 2025-02-06 | End: 2025-03-08

## 2025-02-06 RX ORDER — LORAZEPAM 0.5 MG/1
0.5 TABLET ORAL EVERY 6 HOURS PRN
Qty: 90 TABLET | Refills: 0 | Status: SHIPPED | OUTPATIENT
Start: 2025-02-06 | End: 2025-03-08

## 2025-02-06 NOTE — TELEPHONE ENCOUNTER
Donna Zeng is calling to request a refill on the following medication(s):    Medication Request:  Requested Prescriptions     Pending Prescriptions Disp Refills    oxyCODONE-acetaminophen (PERCOCET) 5-325 MG per tablet 135 tablet 0     Sig: Take 1 tablet by mouth every 4 hours as needed for Pain for up to 30 days. Max Daily Amount: 6 tablets    tiZANidine (ZANAFLEX) 4 MG tablet 90 tablet 3     Sig: Take 1 tablet by mouth every 8 hours as needed (muscle spasm)    LORazepam (ATIVAN) 0.5 MG tablet 90 tablet 0     Sig: Take 1 tablet by mouth every 6 hours as needed for Anxiety for up to 30 days. Max Daily Amount: 2 mg       Last Visit Date (If Applicable):  1/7/2025    Next Visit Date:    5/13/2025

## 2025-02-21 DIAGNOSIS — I10 ESSENTIAL HYPERTENSION: ICD-10-CM

## 2025-02-21 RX ORDER — AMLODIPINE BESYLATE 2.5 MG/1
2.5 TABLET ORAL DAILY
Qty: 90 TABLET | Refills: 0 | Status: SHIPPED | OUTPATIENT
Start: 2025-02-21

## 2025-02-24 DIAGNOSIS — I10 ESSENTIAL HYPERTENSION: ICD-10-CM

## 2025-02-24 RX ORDER — AMLODIPINE BESYLATE 2.5 MG/1
2.5 TABLET ORAL DAILY
Qty: 90 TABLET | Refills: 0 | OUTPATIENT
Start: 2025-02-24

## 2025-03-10 DIAGNOSIS — M54.50 CHRONIC MIDLINE LOW BACK PAIN WITHOUT SCIATICA: ICD-10-CM

## 2025-03-10 DIAGNOSIS — F41.9 ANXIETY: ICD-10-CM

## 2025-03-10 DIAGNOSIS — G89.29 CHRONIC MIDLINE LOW BACK PAIN WITHOUT SCIATICA: ICD-10-CM

## 2025-03-10 RX ORDER — OXYCODONE AND ACETAMINOPHEN 5; 325 MG/1; MG/1
1 TABLET ORAL EVERY 4 HOURS PRN
Qty: 135 TABLET | Refills: 0 | Status: SHIPPED | OUTPATIENT
Start: 2025-03-10 | End: 2025-04-09

## 2025-03-10 RX ORDER — LORAZEPAM 0.5 MG/1
0.5 TABLET ORAL EVERY 6 HOURS PRN
Qty: 90 TABLET | Refills: 0 | Status: SHIPPED | OUTPATIENT
Start: 2025-03-10 | End: 2025-04-09

## 2025-03-10 NOTE — TELEPHONE ENCOUNTER
Donna Zeng is calling to request a refill on the following medication(s):    Last Visit Date (If Applicable):  1/7/2025    Next Visit Date:    5/13/2025    Medication Request:  Requested Prescriptions     Pending Prescriptions Disp Refills    oxyCODONE-acetaminophen (PERCOCET) 5-325 MG per tablet 135 tablet 0     Sig: Take 1 tablet by mouth every 4 hours as needed for Pain for up to 30 days. Max Daily Amount: 6 tablets    tiZANidine (ZANAFLEX) 4 MG tablet 90 tablet 3     Sig: Take 1 tablet by mouth every 8 hours as needed (muscle spasm)    LORazepam (ATIVAN) 0.5 MG tablet 90 tablet 0     Sig: Take 1 tablet by mouth every 6 hours as needed for Anxiety for up to 30 days. Max Daily Amount: 2 mg

## 2025-03-27 DIAGNOSIS — F32.9 REACTIVE DEPRESSION: ICD-10-CM

## 2025-03-27 RX ORDER — CITALOPRAM HYDROBROMIDE 20 MG/1
20 TABLET ORAL DAILY
Qty: 90 TABLET | Refills: 3 | Status: SHIPPED | OUTPATIENT
Start: 2025-03-27

## 2025-03-27 NOTE — TELEPHONE ENCOUNTER
Last Visit:  1/7/2025     Next Visit Date:  Future Appointments   Date Time Provider Department Center   5/13/2025  9:15 AM Maryam Schofield MD [facility] DEP

## 2025-04-10 DIAGNOSIS — M54.50 CHRONIC MIDLINE LOW BACK PAIN WITHOUT SCIATICA: ICD-10-CM

## 2025-04-10 DIAGNOSIS — F41.9 ANXIETY: ICD-10-CM

## 2025-04-10 DIAGNOSIS — G89.29 CHRONIC MIDLINE LOW BACK PAIN WITHOUT SCIATICA: ICD-10-CM

## 2025-04-10 RX ORDER — LORAZEPAM 0.5 MG/1
0.5 TABLET ORAL EVERY 6 HOURS PRN
Qty: 90 TABLET | Refills: 0 | Status: SHIPPED | OUTPATIENT
Start: 2025-04-10 | End: 2025-05-10

## 2025-04-10 RX ORDER — OXYCODONE AND ACETAMINOPHEN 5; 325 MG/1; MG/1
1 TABLET ORAL EVERY 4 HOURS PRN
Qty: 135 TABLET | Refills: 0 | Status: SHIPPED | OUTPATIENT
Start: 2025-04-10 | End: 2025-05-10

## 2025-05-13 ENCOUNTER — OFFICE VISIT (OUTPATIENT)
Dept: FAMILY MEDICINE CLINIC | Age: 70
End: 2025-05-13
Payer: COMMERCIAL

## 2025-05-13 VITALS
HEART RATE: 74 BPM | HEIGHT: 66 IN | SYSTOLIC BLOOD PRESSURE: 132 MMHG | BODY MASS INDEX: 28.99 KG/M2 | WEIGHT: 180.4 LBS | DIASTOLIC BLOOD PRESSURE: 72 MMHG

## 2025-05-13 DIAGNOSIS — M54.50 CHRONIC MIDLINE LOW BACK PAIN WITHOUT SCIATICA: ICD-10-CM

## 2025-05-13 DIAGNOSIS — N18.31 STAGE 3A CHRONIC KIDNEY DISEASE (HCC): ICD-10-CM

## 2025-05-13 DIAGNOSIS — I10 ESSENTIAL HYPERTENSION: Primary | ICD-10-CM

## 2025-05-13 DIAGNOSIS — G89.29 CHRONIC MIDLINE LOW BACK PAIN WITHOUT SCIATICA: ICD-10-CM

## 2025-05-13 DIAGNOSIS — F41.9 ANXIETY: ICD-10-CM

## 2025-05-13 DIAGNOSIS — B00.1 HERPES LABIALIS: ICD-10-CM

## 2025-05-13 DIAGNOSIS — F32.9 REACTIVE DEPRESSION: ICD-10-CM

## 2025-05-13 DIAGNOSIS — E55.9 VITAMIN D DEFICIENCY: ICD-10-CM

## 2025-05-13 DIAGNOSIS — E78.2 MIXED HYPERLIPIDEMIA: ICD-10-CM

## 2025-05-13 PROCEDURE — 1123F ACP DISCUSS/DSCN MKR DOCD: CPT | Performed by: FAMILY MEDICINE

## 2025-05-13 PROCEDURE — 3075F SYST BP GE 130 - 139MM HG: CPT | Performed by: FAMILY MEDICINE

## 2025-05-13 PROCEDURE — 3078F DIAST BP <80 MM HG: CPT | Performed by: FAMILY MEDICINE

## 2025-05-13 PROCEDURE — 99214 OFFICE O/P EST MOD 30 MIN: CPT | Performed by: FAMILY MEDICINE

## 2025-05-13 RX ORDER — DULOXETIN HYDROCHLORIDE 30 MG/1
30 CAPSULE, DELAYED RELEASE ORAL DAILY
Qty: 30 CAPSULE | Refills: 5 | Status: SHIPPED | OUTPATIENT
Start: 2025-05-13

## 2025-05-13 RX ORDER — LORAZEPAM 0.5 MG/1
TABLET ORAL
COMMUNITY
End: 2025-05-13 | Stop reason: SDUPTHER

## 2025-05-13 RX ORDER — VALACYCLOVIR HYDROCHLORIDE 1 G/1
2000 TABLET, FILM COATED ORAL 2 TIMES DAILY
Qty: 4 TABLET | Refills: 1 | Status: SHIPPED | OUTPATIENT
Start: 2025-05-13

## 2025-05-13 RX ORDER — OXYCODONE AND ACETAMINOPHEN 5; 325 MG/1; MG/1
1 TABLET ORAL EVERY 4 HOURS PRN
Qty: 135 TABLET | Refills: 0 | Status: SHIPPED | OUTPATIENT
Start: 2025-05-13 | End: 2025-06-12

## 2025-05-13 RX ORDER — OXYCODONE HYDROCHLORIDE 5 MG/1
7.5 CAPSULE ORAL
COMMUNITY
End: 2025-05-13 | Stop reason: ALTCHOICE

## 2025-05-13 RX ORDER — LORAZEPAM 0.5 MG/1
0.5 TABLET ORAL EVERY 8 HOURS PRN
Qty: 90 TABLET | Refills: 0 | Status: SHIPPED | OUTPATIENT
Start: 2025-05-13 | End: 2025-06-12

## 2025-05-13 SDOH — ECONOMIC STABILITY: FOOD INSECURITY: WITHIN THE PAST 12 MONTHS, YOU WORRIED THAT YOUR FOOD WOULD RUN OUT BEFORE YOU GOT MONEY TO BUY MORE.: SOMETIMES TRUE

## 2025-05-13 SDOH — ECONOMIC STABILITY: FOOD INSECURITY: WITHIN THE PAST 12 MONTHS, THE FOOD YOU BOUGHT JUST DIDN'T LAST AND YOU DIDN'T HAVE MONEY TO GET MORE.: NEVER TRUE

## 2025-05-13 SDOH — ECONOMIC STABILITY: INCOME INSECURITY: IN THE LAST 12 MONTHS, WAS THERE A TIME WHEN YOU WERE NOT ABLE TO PAY THE MORTGAGE OR RENT ON TIME?: NO

## 2025-05-13 NOTE — PROGRESS NOTES
[facility]  [facility] MEDICINE  [address]  [address]  [facility] [address]  Dept: [phone]    5/13/2025    CHIEF COMPLAINT    Chief Complaint   Patient presents with    Anxiety       HPI    Donna Zeng is a 69 y.o. female who presents   Chief Complaint   Patient presents with    Anxiety   .  HPI  History of Present Illness  69-year-old female presents for recheck of chronic pain and anxiety.    Chronic Pain  Reports persistent lower back pain attributed to lawn mowing season. No leg weakness or radiating pain, but has neuropathy in feet. Previously tried gabapentin.  - Onset: Attributed to lawn mowing season.  - Location: Lower back.  - Character: Persistent pain, neuropathy in feet.  - Alleviating/Aggravating Factors: Previously tried gabapentin.    Anxiety  Requests refill of lorazepam, taken as needed every 8 hours for anxiety.  - Alleviating/Aggravating Factors: Lorazepam taken as needed every 8 hours.    Sleep Disturbances  Experiences sleep disturbances, waking at 2:30 or 3:30 AM, but sleeps from 7:30 AM to 10:00 AM without difficulty. Muscle relaxants were ineffective.  - Onset: Waking at 2:30 or 3:30 AM.  - Duration: Sleeps from 7:30 AM to 10:00 AM without difficulty.  - Alleviating/Aggravating Factors: Muscle relaxants were ineffective.    Hypertension  Currently on amlodipine for hypertension.  - Alleviating/Aggravating Factors: Amlodipine.    Dizziness  Has meclizine for dizziness, rarely used.  - Alleviating/Aggravating Factors: Meclizine, rarely used.    Cold Sores  History of cold sores triggered by sun exposure, occurring twice a year.  - Onset: Triggered by sun exposure.  - Timing: Occurring twice a year.    Vitals:    05/13/25 0940   BP: 132/72   Pulse: 74   Weight: 81.8 kg (180 lb 6.4 oz)   Height: 1.676 m (5' 6\")       REVIEW OF SYSTEMS    Review of Systems    PAST MEDICAL HISTORY    Past Medical History:   Diagnosis Date    Allergic rhinitis

## 2025-06-02 DIAGNOSIS — I10 ESSENTIAL HYPERTENSION: ICD-10-CM

## 2025-06-02 RX ORDER — AMLODIPINE BESYLATE 2.5 MG/1
2.5 TABLET ORAL DAILY
Qty: 90 TABLET | Refills: 3 | Status: SHIPPED | OUTPATIENT
Start: 2025-06-02

## 2025-06-09 DIAGNOSIS — G89.29 CHRONIC MIDLINE LOW BACK PAIN WITHOUT SCIATICA: ICD-10-CM

## 2025-06-09 DIAGNOSIS — F41.9 ANXIETY: ICD-10-CM

## 2025-06-09 DIAGNOSIS — M54.50 CHRONIC MIDLINE LOW BACK PAIN WITHOUT SCIATICA: ICD-10-CM

## 2025-06-09 NOTE — TELEPHONE ENCOUNTER
Donna Zeng is calling to request a refill on the following medication(s):    Last Visit Date (If Applicable):  5/13/2025    Next Visit Date:    8/14/2025    Medication Request:  Requested Prescriptions     Pending Prescriptions Disp Refills    oxyCODONE-acetaminophen (PERCOCET) 5-325 MG per tablet 135 tablet 0     Sig: Take 1 tablet by mouth every 4 hours as needed for Pain for up to 30 days. Max Daily Amount: 6 tablets    LORazepam (ATIVAN) 0.5 MG tablet 90 tablet 0     Sig: Take 1 tablet by mouth every 8 hours as needed for Anxiety for up to 30 days. Max Daily Amount: 1.5 mg

## 2025-06-10 RX ORDER — OXYCODONE AND ACETAMINOPHEN 5; 325 MG/1; MG/1
1 TABLET ORAL EVERY 4 HOURS PRN
Qty: 135 TABLET | Refills: 0 | Status: SHIPPED | OUTPATIENT
Start: 2025-06-10 | End: 2025-07-10

## 2025-06-10 RX ORDER — LORAZEPAM 0.5 MG/1
0.5 TABLET ORAL EVERY 8 HOURS PRN
Qty: 90 TABLET | Refills: 0 | Status: SHIPPED | OUTPATIENT
Start: 2025-06-10 | End: 2025-07-10

## 2025-07-09 DIAGNOSIS — G89.29 CHRONIC MIDLINE LOW BACK PAIN WITHOUT SCIATICA: ICD-10-CM

## 2025-07-09 DIAGNOSIS — F41.9 ANXIETY: ICD-10-CM

## 2025-07-09 DIAGNOSIS — M54.50 CHRONIC MIDLINE LOW BACK PAIN WITHOUT SCIATICA: ICD-10-CM

## 2025-07-10 RX ORDER — OXYCODONE AND ACETAMINOPHEN 5; 325 MG/1; MG/1
1 TABLET ORAL EVERY 4 HOURS PRN
Qty: 135 TABLET | Refills: 0 | Status: SHIPPED | OUTPATIENT
Start: 2025-07-10 | End: 2025-08-09

## 2025-07-10 RX ORDER — LORAZEPAM 0.5 MG/1
0.5 TABLET ORAL EVERY 8 HOURS PRN
Qty: 90 TABLET | Refills: 0 | Status: SHIPPED | OUTPATIENT
Start: 2025-07-10 | End: 2025-08-09

## 2025-08-14 ENCOUNTER — OFFICE VISIT (OUTPATIENT)
Dept: FAMILY MEDICINE CLINIC | Age: 70
End: 2025-08-14
Payer: COMMERCIAL

## 2025-08-14 ENCOUNTER — HOSPITAL ENCOUNTER (OUTPATIENT)
Age: 70
Setting detail: SPECIMEN
Discharge: HOME | End: 2025-08-14

## 2025-08-14 VITALS
HEART RATE: 81 BPM | DIASTOLIC BLOOD PRESSURE: 84 MMHG | WEIGHT: 178.2 LBS | BODY MASS INDEX: 28.76 KG/M2 | SYSTOLIC BLOOD PRESSURE: 132 MMHG

## 2025-08-14 DIAGNOSIS — I10 ESSENTIAL HYPERTENSION: ICD-10-CM

## 2025-08-14 DIAGNOSIS — E55.9 VITAMIN D DEFICIENCY: ICD-10-CM

## 2025-08-14 DIAGNOSIS — Z86.2 HISTORY OF ANEMIA: ICD-10-CM

## 2025-08-14 DIAGNOSIS — N18.31 STAGE 3A CHRONIC KIDNEY DISEASE (HCC): ICD-10-CM

## 2025-08-14 DIAGNOSIS — G89.29 CHRONIC MIDLINE LOW BACK PAIN WITHOUT SCIATICA: Primary | ICD-10-CM

## 2025-08-14 DIAGNOSIS — R73.09 ABNORMAL GLUCOSE: ICD-10-CM

## 2025-08-14 DIAGNOSIS — Z51.81 ENCOUNTER FOR MEDICATION MONITORING: ICD-10-CM

## 2025-08-14 DIAGNOSIS — F41.9 ANXIETY: ICD-10-CM

## 2025-08-14 DIAGNOSIS — M54.50 CHRONIC MIDLINE LOW BACK PAIN WITHOUT SCIATICA: Primary | ICD-10-CM

## 2025-08-14 DIAGNOSIS — E78.2 MIXED HYPERLIPIDEMIA: ICD-10-CM

## 2025-08-14 DIAGNOSIS — Z91.030 BEE STING ALLERGY: ICD-10-CM

## 2025-08-14 LAB
25(OH)D3 SERPL-MCNC: 30.8 NG/ML (ref 30–100)
ALBUMIN SERPL-MCNC: 4.4 G/DL (ref 3.5–5.2)
ALBUMIN/GLOB SERPL: 1.3 {RATIO} (ref 1–2.5)
ALP SERPL-CCNC: 99 U/L (ref 35–104)
ALT SERPL-CCNC: 25 U/L (ref 10–35)
AMPHET UR QL SCN: NEGATIVE
ANION GAP SERPL CALCULATED.3IONS-SCNC: 14 MMOL/L (ref 9–16)
AST SERPL-CCNC: 32 U/L (ref 10–35)
BARBITURATES UR QL SCN: NEGATIVE
BASOPHILS # BLD: 0.05 K/UL (ref 0–0.2)
BASOPHILS NFR BLD: 1 % (ref 0–2)
BENZODIAZ UR QL: POSITIVE
BILIRUB SERPL-MCNC: 1.1 MG/DL (ref 0–1.2)
BUN SERPL-MCNC: 21 MG/DL (ref 8–23)
CALCIUM SERPL-MCNC: 9.8 MG/DL (ref 8.6–10.4)
CANNABINOIDS UR QL SCN: NEGATIVE
CHLORIDE SERPL-SCNC: 104 MMOL/L (ref 98–107)
CHOLEST SERPL-MCNC: 185 MG/DL (ref 0–199)
CHOLESTEROL/HDL RATIO: 4.6
CO2 SERPL-SCNC: 25 MMOL/L (ref 20–31)
COCAINE UR QL SCN: NEGATIVE
CREAT SERPL-MCNC: 1.4 MG/DL (ref 0.6–0.9)
EOSINOPHIL # BLD: 0.42 K/UL (ref 0–0.44)
EOSINOPHILS RELATIVE PERCENT: 7 % (ref 1–4)
ERYTHROCYTE [DISTWIDTH] IN BLOOD BY AUTOMATED COUNT: 13.3 % (ref 11.8–14.4)
EST. AVERAGE GLUCOSE BLD GHB EST-MCNC: 131 MG/DL
FENTANYL UR QL: NEGATIVE
GFR, ESTIMATED: 40 ML/MIN/1.73M2
GLUCOSE SERPL-MCNC: 121 MG/DL (ref 74–99)
HBA1C MFR BLD: 6.2 % (ref 4–6)
HCT VFR BLD AUTO: 42.1 % (ref 36.3–47.1)
HDLC SERPL-MCNC: 40 MG/DL
HGB BLD-MCNC: 13.1 G/DL (ref 11.9–15.1)
IMM GRANULOCYTES # BLD AUTO: <0.03 K/UL (ref 0–0.3)
IMM GRANULOCYTES NFR BLD: 0 %
LDLC SERPL CALC-MCNC: 122 MG/DL (ref 0–100)
LYMPHOCYTES NFR BLD: 1.02 K/UL (ref 1.1–3.7)
LYMPHOCYTES RELATIVE PERCENT: 17 % (ref 24–43)
MCH RBC QN AUTO: 27.1 PG (ref 25.2–33.5)
MCHC RBC AUTO-ENTMCNC: 31.1 G/DL (ref 28.4–34.8)
MCV RBC AUTO: 87.2 FL (ref 82.6–102.9)
METHADONE UR QL: NEGATIVE
MONOCYTES NFR BLD: 0.72 K/UL (ref 0.1–1.2)
MONOCYTES NFR BLD: 12 % (ref 3–12)
NEUTROPHILS NFR BLD: 63 % (ref 36–65)
NEUTS SEG NFR BLD: 3.97 K/UL (ref 1.5–8.1)
NRBC BLD-RTO: 0 PER 100 WBC
OPIATES UR QL SCN: NEGATIVE
OXYCODONE UR QL SCN: POSITIVE
PCP UR QL SCN: NEGATIVE
PLATELET # BLD AUTO: 239 K/UL (ref 138–453)
PMV BLD AUTO: 11 FL (ref 8.1–13.5)
POTASSIUM SERPL-SCNC: 4.9 MMOL/L (ref 3.7–5.3)
PROT SERPL-MCNC: 7.8 G/DL (ref 6.6–8.7)
RBC # BLD AUTO: 4.83 M/UL (ref 3.95–5.11)
SODIUM SERPL-SCNC: 143 MMOL/L (ref 136–145)
TEST INFORMATION: ABNORMAL
TRIGL SERPL-MCNC: 117 MG/DL
VLDLC SERPL CALC-MCNC: 23 MG/DL (ref 1–30)
WBC OTHER # BLD: 6.2 K/UL (ref 3.5–11.3)

## 2025-08-14 PROCEDURE — 99214 OFFICE O/P EST MOD 30 MIN: CPT | Performed by: FAMILY MEDICINE

## 2025-08-14 PROCEDURE — 3075F SYST BP GE 130 - 139MM HG: CPT | Performed by: FAMILY MEDICINE

## 2025-08-14 PROCEDURE — 3079F DIAST BP 80-89 MM HG: CPT | Performed by: FAMILY MEDICINE

## 2025-08-14 PROCEDURE — 1123F ACP DISCUSS/DSCN MKR DOCD: CPT | Performed by: FAMILY MEDICINE

## 2025-08-14 RX ORDER — EPINEPHRINE 0.3 MG/.3ML
INJECTION SUBCUTANEOUS
Qty: 2 EACH | Refills: 2 | Status: SHIPPED | OUTPATIENT
Start: 2025-08-14

## 2025-08-14 RX ORDER — EPINEPHRINE 2 MG/100UL
2 SPRAY NASAL ONCE AS NEEDED
Qty: 1 EACH | Refills: 1 | Status: SHIPPED | OUTPATIENT
Start: 2025-08-14

## 2025-08-14 RX ORDER — OXYCODONE AND ACETAMINOPHEN 5; 325 MG/1; MG/1
1 TABLET ORAL EVERY 4 HOURS PRN
Qty: 135 TABLET | Refills: 0 | Status: SHIPPED | OUTPATIENT
Start: 2025-08-14 | End: 2025-09-13

## 2025-08-14 RX ORDER — LORAZEPAM 0.5 MG/1
0.5 TABLET ORAL EVERY 6 HOURS PRN
Qty: 90 TABLET | Refills: 0 | Status: SHIPPED | OUTPATIENT
Start: 2025-08-14 | End: 2025-09-13

## 2025-08-14 RX ORDER — OXYCODONE AND ACETAMINOPHEN 5; 325 MG/1; MG/1
1 TABLET ORAL EVERY 4 HOURS PRN
COMMUNITY
End: 2025-08-14 | Stop reason: SDUPTHER

## 2025-08-14 RX ORDER — LORAZEPAM 0.5 MG/1
0.5 TABLET ORAL EVERY 6 HOURS PRN
COMMUNITY
End: 2025-08-14 | Stop reason: SDUPTHER